# Patient Record
Sex: MALE | Race: BLACK OR AFRICAN AMERICAN | NOT HISPANIC OR LATINO | Employment: FULL TIME | RURAL
[De-identification: names, ages, dates, MRNs, and addresses within clinical notes are randomized per-mention and may not be internally consistent; named-entity substitution may affect disease eponyms.]

---

## 2024-06-28 DIAGNOSIS — R97.20 ELEVATED PSA: Primary | ICD-10-CM

## 2024-07-31 DIAGNOSIS — J44.9 COPD (CHRONIC OBSTRUCTIVE PULMONARY DISEASE): ICD-10-CM

## 2024-07-31 DIAGNOSIS — R91.1 PULMONARY NODULE: Primary | ICD-10-CM

## 2024-09-09 ENCOUNTER — OFFICE VISIT (OUTPATIENT)
Dept: PULMONOLOGY | Facility: CLINIC | Age: 59
End: 2024-09-09
Payer: COMMERCIAL

## 2024-09-09 VITALS
BODY MASS INDEX: 27.43 KG/M2 | HEIGHT: 73 IN | HEART RATE: 93 BPM | WEIGHT: 207 LBS | SYSTOLIC BLOOD PRESSURE: 128 MMHG | OXYGEN SATURATION: 96 % | DIASTOLIC BLOOD PRESSURE: 85 MMHG

## 2024-09-09 DIAGNOSIS — R06.2 WHEEZING: ICD-10-CM

## 2024-09-09 DIAGNOSIS — J44.9 COPD (CHRONIC OBSTRUCTIVE PULMONARY DISEASE): ICD-10-CM

## 2024-09-09 DIAGNOSIS — R91.1 PULMONARY NODULE: ICD-10-CM

## 2024-09-09 DIAGNOSIS — R91.1 SOLITARY PULMONARY NODULE: Primary | ICD-10-CM

## 2024-09-09 PROCEDURE — 99999 PR PBB SHADOW E&M-EST. PATIENT-LVL IV: CPT | Mod: PBBFAC,,, | Performed by: STUDENT IN AN ORGANIZED HEALTH CARE EDUCATION/TRAINING PROGRAM

## 2024-09-09 PROCEDURE — 3008F BODY MASS INDEX DOCD: CPT | Mod: CPTII,,, | Performed by: STUDENT IN AN ORGANIZED HEALTH CARE EDUCATION/TRAINING PROGRAM

## 2024-09-09 PROCEDURE — 99204 OFFICE O/P NEW MOD 45 MIN: CPT | Mod: S$PBB,,, | Performed by: STUDENT IN AN ORGANIZED HEALTH CARE EDUCATION/TRAINING PROGRAM

## 2024-09-09 PROCEDURE — 99214 OFFICE O/P EST MOD 30 MIN: CPT | Mod: PBBFAC | Performed by: STUDENT IN AN ORGANIZED HEALTH CARE EDUCATION/TRAINING PROGRAM

## 2024-09-09 PROCEDURE — 3074F SYST BP LT 130 MM HG: CPT | Mod: CPTII,,, | Performed by: STUDENT IN AN ORGANIZED HEALTH CARE EDUCATION/TRAINING PROGRAM

## 2024-09-09 PROCEDURE — 3079F DIAST BP 80-89 MM HG: CPT | Mod: CPTII,,, | Performed by: STUDENT IN AN ORGANIZED HEALTH CARE EDUCATION/TRAINING PROGRAM

## 2024-09-09 NOTE — PROGRESS NOTES
Patient Name: Mik Lyles   Primary Care Provider: Damaris Salazar FNP-C   Date of Service: 09/09/2024   Reason for Referral:  Pulmonary nodule    Chief Complaint: Referral (RF Damaris Salazar) and Pulmonary Nodules      Subjective:      Mik Lyles is 59 y.o. male with past medical history significant for smoking who now presents in the setting of shortness of breath and pulmonary nodule      Initial clinic visit 9/9/24  5.8% risk   COPD, started nebulized medication with albuterol and inhaled corticosteroids, does not remember all medication .  Significant improvement  Low risk .  I personally reviewed the patient's CT chest and noted the lung nodule in the right middle lobe, and I agree that this may be an intrapulmonary lymph node.    Follow up November 11   Willingness to quit, 3-6 cigars per day  Dyspneic with exertion but able to walk up multiple flights of stairs daily   No wheezing on exam      Assessment and Plan      Solitary pulmonary nodule   Dyspnea with exertion   Wheezing  COPD      Assessment:  Patient with likely evidence of COPD, along with wheezing which may suggest reactive airway disease.  Solitary pulmonary nodule low risk at this time, concerning for intrapulmonary lymph node but requires further evaluation with surveillance imaging.  Continues to have some dyspnea with exertion.    Plan  Repeat CT chest in 3-6 months   Ordered:Complete PFTs with pre and post bronchodilator testing and 6 minute walk test (pulmonary stress test)    Counseled to call the clinic or go to the emergency department/call 911 in the event of worsening symptoms or any other red flag signs/symptoms as explained to the patient in detail    Nicotine dependence, cigarettes   Tobacco cessation counseling      Assessment:  Patient currently smokes 1 pack of cigars (has 3 cigars in it).  We spent a significant amount of time talking about tobacco cessation, the factors that keep the patient smoking such as habit and  cravings.  We also discussed in details ways to stop smoking after the patient expressed a strong desire to quit. The patient is not interested in nicotine replacement therapy at this time.  Duration of counseling approximately 11 minutes.    Plan  We will continue to monitor progress on next visit   Nicotine replacement therapy is not prescribed at this time (patches and p.r.n. lozenges)       Lung cancer screening      Assessment:  The patient meets criteria for lung cancer screening (has a greater than 20 pack-year history of smoking, is 59  years old and has not quit more than 15 years ago).  We had a discussion regarding what lung cancer screening entails and after shared decision-making, the patient agreed to low-dose CT scan.    Plan  Low-dose CT scan for lung cancer screening to be determined after upcoming CT scan        Red Stuart MD  Interventional Pulmonary and Critical Care  [facility]      Problem List Items Addressed This Visit    None  Visit Diagnoses       Pulmonary nodule        COPD (chronic obstructive pulmonary disease)                    History reviewed. No pertinent past medical history.   Past Surgical History:   Procedure Laterality Date    KNEE SURGERY Left      Family History   Problem Relation Name Age of Onset    Hypertension Mother      Cancer Father       Review of patient's allergies indicates:  No Known Allergies   Social History     Tobacco Use    Smoking status: Every Day     Types: Cigars     Start date: 1/1/1985     Passive exposure: Never    Smokeless tobacco: Never   Substance Use Topics    Alcohol use: Yes    Drug use: Yes     Types: Marijuana      Review of Systems       Objective:      Physical Exam  Constitutional:       General: He is not in acute distress.     Appearance: Normal appearance. He is normal weight. He is not ill-appearing or diaphoretic.   HENT:      Head: Normocephalic and atraumatic.      Nose: No congestion or rhinorrhea.       "Mouth/Throat:      Mouth: Mucous membranes are moist.   Cardiovascular:      Rate and Rhythm: Normal rate and regular rhythm.      Pulses: Normal pulses.      Heart sounds: Normal heart sounds.   Pulmonary:      Effort: Pulmonary effort is normal. No respiratory distress.      Breath sounds: No stridor. Wheezing present. No rhonchi or rales.   Chest:      Chest wall: No tenderness.   Abdominal:      General: Abdomen is flat.   Musculoskeletal:      Cervical back: Normal range of motion. No rigidity.      Right lower leg: No edema.      Left lower leg: No edema.   Skin:     General: Skin is warm.      Findings: No erythema.   Neurological:      General: No focal deficit present.      Mental Status: He is alert and oriented to person, place, and time. Mental status is at baseline.   Psychiatric:         Mood and Affect: Mood normal.         Behavior: Behavior normal.         Thought Content: Thought content normal.                9/9/2024     1:15 PM   Pulmonary Function Tests   SpO2 96 %   Height 6' 1" (1.854 m)   Weight 93.9 kg (207 lb)   BMI (Calculated) 27.3         No outpatient encounter medications on file as of 9/9/2024.     No facility-administered encounter medications on file as of 9/9/2024.       Assessment & Plan    As above                                          No orders of the defined types were placed in this encounter.                  "

## 2024-11-11 ENCOUNTER — HOSPITAL ENCOUNTER (OUTPATIENT)
Dept: RADIOLOGY | Facility: HOSPITAL | Age: 59
Discharge: HOME | End: 2024-11-11
Attending: STUDENT IN AN ORGANIZED HEALTH CARE EDUCATION/TRAINING PROGRAM
Payer: COMMERCIAL

## 2024-11-11 ENCOUNTER — CLINICAL SUPPORT (OUTPATIENT)
Dept: PULMONOLOGY | Facility: HOSPITAL | Age: 59
End: 2024-11-11
Attending: STUDENT IN AN ORGANIZED HEALTH CARE EDUCATION/TRAINING PROGRAM
Payer: COMMERCIAL

## 2024-11-11 ENCOUNTER — OFFICE VISIT (OUTPATIENT)
Dept: PULMONOLOGY | Facility: CLINIC | Age: 59
End: 2024-11-11
Payer: COMMERCIAL

## 2024-11-11 ENCOUNTER — OFFICE VISIT (OUTPATIENT)
Dept: UROLOGY | Facility: CLINIC | Age: 59
End: 2024-11-11
Payer: COMMERCIAL

## 2024-11-11 VITALS
RESPIRATION RATE: 16 BRPM | HEART RATE: 104 BPM | HEIGHT: 73 IN | OXYGEN SATURATION: 89 % | BODY MASS INDEX: 26.3 KG/M2 | SYSTOLIC BLOOD PRESSURE: 116 MMHG | DIASTOLIC BLOOD PRESSURE: 88 MMHG | WEIGHT: 198.44 LBS

## 2024-11-11 VITALS — OXYGEN SATURATION: 97 %

## 2024-11-11 VITALS
HEIGHT: 73 IN | DIASTOLIC BLOOD PRESSURE: 90 MMHG | BODY MASS INDEX: 26.51 KG/M2 | TEMPERATURE: 98 F | WEIGHT: 200 LBS | SYSTOLIC BLOOD PRESSURE: 140 MMHG | OXYGEN SATURATION: 97 % | HEART RATE: 101 BPM

## 2024-11-11 VITALS — WEIGHT: 200 LBS | HEIGHT: 73 IN | BODY MASS INDEX: 26.51 KG/M2

## 2024-11-11 DIAGNOSIS — J44.9 CHRONIC OBSTRUCTIVE PULMONARY DISEASE, UNSPECIFIED COPD TYPE: ICD-10-CM

## 2024-11-11 DIAGNOSIS — R31.9 URINARY TRACT INFECTION WITH HEMATURIA, SITE UNSPECIFIED: ICD-10-CM

## 2024-11-11 DIAGNOSIS — R91.1 SOLITARY PULMONARY NODULE: ICD-10-CM

## 2024-11-11 DIAGNOSIS — R97.20 ELEVATED PSA: ICD-10-CM

## 2024-11-11 DIAGNOSIS — R91.1 SOLITARY PULMONARY NODULE: Primary | ICD-10-CM

## 2024-11-11 DIAGNOSIS — Z01.812 PRE-PROCEDURE LAB EXAM: Primary | ICD-10-CM

## 2024-11-11 DIAGNOSIS — J44.89 ASTHMA-COPD OVERLAP SYNDROME: ICD-10-CM

## 2024-11-11 DIAGNOSIS — N39.0 URINARY TRACT INFECTION WITH HEMATURIA, SITE UNSPECIFIED: ICD-10-CM

## 2024-11-11 DIAGNOSIS — C34.90 MALIGNANT NEOPLASM OF UNSPECIFIED PART OF UNSPECIFIED BRONCHUS OR LUNG: ICD-10-CM

## 2024-11-11 DIAGNOSIS — N40.2 PROSTATE NODULE: ICD-10-CM

## 2024-11-11 DIAGNOSIS — J45.901 EXACERBATION OF ASTHMA, UNSPECIFIED ASTHMA SEVERITY, UNSPECIFIED WHETHER PERSISTENT: ICD-10-CM

## 2024-11-11 DIAGNOSIS — R06.00 DYSPNEA, UNSPECIFIED TYPE: ICD-10-CM

## 2024-11-11 DIAGNOSIS — N40.2 PROSTATE NODULE: Primary | ICD-10-CM

## 2024-11-11 DIAGNOSIS — K40.90 NON-RECURRENT UNILATERAL INGUINAL HERNIA WITHOUT OBSTRUCTION OR GANGRENE: ICD-10-CM

## 2024-11-11 PROCEDURE — 1160F RVW MEDS BY RX/DR IN RCRD: CPT | Mod: CPTII,,, | Performed by: UROLOGY

## 2024-11-11 PROCEDURE — 1159F MED LIST DOCD IN RCRD: CPT | Mod: CPTII,,, | Performed by: UROLOGY

## 2024-11-11 PROCEDURE — 27100098 HC SPACER

## 2024-11-11 PROCEDURE — 94060 EVALUATION OF WHEEZING: CPT

## 2024-11-11 PROCEDURE — 99214 OFFICE O/P EST MOD 30 MIN: CPT | Mod: PBBFAC,25 | Performed by: STUDENT IN AN ORGANIZED HEALTH CARE EDUCATION/TRAINING PROGRAM

## 2024-11-11 PROCEDURE — 94729 DIFFUSING CAPACITY: CPT

## 2024-11-11 PROCEDURE — 71250 CT THORAX DX C-: CPT | Mod: 26,,, | Performed by: RADIOLOGY

## 2024-11-11 PROCEDURE — 87086 URINE CULTURE/COLONY COUNT: CPT | Mod: ,,, | Performed by: CLINICAL MEDICAL LABORATORY

## 2024-11-11 PROCEDURE — 99999 PR PBB SHADOW E&M-EST. PATIENT-LVL IV: CPT | Mod: PBBFAC,,, | Performed by: STUDENT IN AN ORGANIZED HEALTH CARE EDUCATION/TRAINING PROGRAM

## 2024-11-11 PROCEDURE — 94618 PULMONARY STRESS TESTING: CPT

## 2024-11-11 PROCEDURE — 3008F BODY MASS INDEX DOCD: CPT | Mod: CPTII,,, | Performed by: UROLOGY

## 2024-11-11 PROCEDURE — 99214 OFFICE O/P EST MOD 30 MIN: CPT | Mod: PBBFAC | Performed by: UROLOGY

## 2024-11-11 PROCEDURE — 3077F SYST BP >= 140 MM HG: CPT | Mod: CPTII,,, | Performed by: UROLOGY

## 2024-11-11 PROCEDURE — 3080F DIAST BP >= 90 MM HG: CPT | Mod: CPTII,,, | Performed by: UROLOGY

## 2024-11-11 PROCEDURE — 71250 CT THORAX DX C-: CPT | Mod: TC

## 2024-11-11 PROCEDURE — 99204 OFFICE O/P NEW MOD 45 MIN: CPT | Mod: S$PBB,,, | Performed by: UROLOGY

## 2024-11-11 PROCEDURE — 94726 PLETHYSMOGRAPHY LUNG VOLUMES: CPT

## 2024-11-11 PROCEDURE — 99999 PR PBB SHADOW E&M-EST. PATIENT-LVL IV: CPT | Mod: PBBFAC,,, | Performed by: UROLOGY

## 2024-11-11 RX ORDER — IPRATROPIUM BROMIDE AND ALBUTEROL SULFATE 2.5; .5 MG/3ML; MG/3ML
3 SOLUTION RESPIRATORY (INHALATION) EVERY 6 HOURS PRN
Qty: 75 ML | Refills: 0 | Status: SHIPPED | OUTPATIENT
Start: 2024-11-11 | End: 2025-11-11

## 2024-11-11 RX ORDER — PREDNISONE 20 MG/1
40 TABLET ORAL DAILY
Qty: 10 TABLET | Refills: 0 | Status: SHIPPED | OUTPATIENT
Start: 2024-11-11 | End: 2024-11-16

## 2024-11-11 RX ORDER — FLUTICASONE PROPIONATE AND SALMETEROL 250; 50 UG/1; UG/1
1 POWDER RESPIRATORY (INHALATION) 2 TIMES DAILY
Qty: 60 EACH | Refills: 11 | Status: SHIPPED | OUTPATIENT
Start: 2024-11-11 | End: 2025-11-11

## 2024-11-11 RX ORDER — BUDESONIDE 0.5 MG/2ML
0.5 INHALANT ORAL 2 TIMES DAILY
Qty: 120 ML | Refills: 5 | Status: SHIPPED | OUTPATIENT
Start: 2024-11-11 | End: 2025-11-11

## 2024-11-11 NOTE — PROGRESS NOTES
Patient Name: Mik Lyles   Primary Care Provider: Damaris Salazar FNP-C   Date of Service:  11/11/24   Reason for Referral:  Pulmonary nodule    Chief Complaint: Pulmonary Nodules (2mo follow up/PFT/6MWT/CT.)      Subjective:      Mik Lyles is 59 y.o. male with past medical history significant for smoking who now presents in the setting of shortness of breath and pulmonary nodule    Follow up clinic visit 11/11/24   Asthma/COPD overlap syndrome on recent pulmonary function testing.  I reviewed the patient's CT chest with stable lung nodule present in the right middle lobe.  Experiencing exacerbation with increased expiratory wheezing, and increased exacerbation with ambulation of the patient's work (works at a [employer]).  Has been using more nebulized albuterol.      Initial clinic visit 9/9/24  5.8% risk   COPD, started nebulized medication with albuterol and inhaled corticosteroids, does not remember all medication .  Significant improvement  Low risk .  I personally reviewed the patient's CT chest and noted the lung nodule in the right middle lobe, and I agree that this may be an intrapulmonary lymph node.    Follow up November 11   Willingness to quit, 3-6 cigars per day  Dyspneic with exertion but able to walk up multiple flights of stairs daily   No wheezing on exam      Assessment and Plan      Solitary pulmonary nodule   Asthma/COPD overlap syndrome, with exacerbation   Wheezing   Dyspnea with exertion      Assessment:  Evidence of asthma/COPD overlap syndrome on pulmonary function testing, does not require oxygen with 6 minute walk test.  Is in a current exacerbation with expiratory wheezing however in no respiratory distress.  Also reviewed patient's pulmonary nodule which is stable at this time.    Plan  PET-CT scan to be ordered and performed in the next 4-6 weeks   Nebulized DuoNebs, budesonide prescribed for the next 7-10 days and ics/Laba inhaler prescribed to be used after that   5  days of prednisone also prescribed with the patient   Counseled to call the clinic or go to the emergency department/call 911 in the event of worsening symptoms or any other red flag signs/symptoms as explained to the patient in detail    Nicotine dependence, cigarettes   Tobacco cessation counseling      Assessment:  Patient currently smokes 1 pack of cigars (has 3 cigars in it).  We spent a significant amount of time talking about tobacco cessation, the factors that keep the patient smoking such as habit and cravings.  We also discussed in details ways to stop smoking after the patient expressed a strong desire to quit. The patient is not interested in nicotine replacement therapy at this time.  Duration of counseling approximately 11 minutes.    Plan  We will continue to monitor progress on next visit   Nicotine replacement therapy is not prescribed at this time (patches and p.r.n. lozenges)       Lung cancer screening      Assessment:  The patient meets criteria for lung cancer screening (has a greater than 20 pack-year history of smoking, is 59  years old and has not quit more than 15 years ago).  We had a discussion regarding what lung cancer screening entails and after shared decision-making, the patient agreed to low-dose CT scan.    Plan  Low-dose CT scan for lung cancer screening to be determined after upcoming PET CT scan        Red Stuart MD  Interventional Pulmonary and Critical Care  [facility]      Problem List Items Addressed This Visit    None  Visit Diagnoses       Solitary pulmonary nodule    -  Primary    Relevant Orders    NM PET CT FDG Skull Base to Mid Thigh    Asthma-COPD overlap syndrome        Dyspnea, unspecified type        Exacerbation of asthma, unspecified asthma severity, unspecified whether persistent        Malignant neoplasm of unspecified part of unspecified bronchus or lung                    History reviewed. No pertinent past medical history.   Past Surgical  History:   Procedure Laterality Date    KNEE SURGERY Left      Family History   Problem Relation Name Age of Onset    Hypertension Mother      Cancer Father       Review of patient's allergies indicates:  No Known Allergies   Social History     Tobacco Use    Smoking status: Every Day     Types: Cigars     Start date: 1/1/1985     Passive exposure: Never    Smokeless tobacco: Never   Substance Use Topics    Alcohol use: Yes    Drug use: Yes     Types: Marijuana      Review of Systems       Objective:      Physical Exam  Constitutional:       General: He is not in acute distress.     Appearance: Normal appearance. He is normal weight. He is not ill-appearing or diaphoretic.   HENT:      Head: Normocephalic and atraumatic.      Nose: No congestion or rhinorrhea.      Mouth/Throat:      Mouth: Mucous membranes are moist.   Cardiovascular:      Rate and Rhythm: Normal rate and regular rhythm.      Pulses: Normal pulses.      Heart sounds: Normal heart sounds.   Pulmonary:      Effort: Pulmonary effort is normal. No respiratory distress.      Breath sounds: No stridor. Wheezing present. No rhonchi or rales.   Chest:      Chest wall: No tenderness.   Abdominal:      General: Abdomen is flat.   Musculoskeletal:      Cervical back: Normal range of motion. No rigidity.      Right lower leg: No edema.      Left lower leg: No edema.   Skin:     General: Skin is warm.      Findings: No erythema.   Neurological:      General: No focal deficit present.      Mental Status: He is alert and oriented to person, place, and time. Mental status is at baseline.   Psychiatric:         Mood and Affect: Mood normal.         Behavior: Behavior normal.         Thought Content: Thought content normal.                11/11/2024     1:09 PM 11/11/2024    10:00 AM 11/11/2024     9:30 AM 11/11/2024     7:59 AM 9/9/2024     1:15 PM   Pulmonary Function Tests   FVC  4.27 liters      FVC%  82      FEV1  2.75 liters      FEV1%  69      FEF 25-75  1.46  "     FEF 25-75%  44      TLC (liters)  9.74 liters      TLC%  129      RV  4.59      RV%  195      DLCO (ml/mmHg sec)  21.32 ml/mmHg sec      DLCO%  70      Peak Flow  461 L/min      FiO2 (%)  21 %      SpO2 89 % 97 %  97 % 96 %   Ordering Provider   Dr. Red Stuart     Performing nurse/tech/RT   Destiny Bryant CRT     Diagnosis   Shortness of Breath     Height 6' 1" (1.854 m)  6' 1" (1.854 m) 6' 1" (1.854 m) 6' 1" (1.854 m)   Weight 90 kg (198 lb 6.6 oz)  90.7 kg (200 lb) 90.7 kg (200 lb) 93.9 kg (207 lb)   BMI (Calculated) 26.2  26.4 26.4 27.3   6MWT Status   completed without stopping     Patient Reported   No complaints     Was O2 used?   No     6MW Distance walked (feet)   1366 feet     Distance walked (meters)   416.36 meters     Did patient stop?   No     Type of assistive device(s) used?   no assistive devices     Oxygen Saturation   96 %     Supplemental Oxygen   Room Air     Heart Rate   92 bpm     Blood Pressure   136/96     Savage Dyspnea Rating    light     Oxygen Saturation   94 %     Supplemental Oxygen   Room Air     Heart Rate   101 bpm     Blood Pressure   144/94     Savage Dyspnea Rating    moderate     Recovery Time (seconds)   60 seconds     Oxygen Saturation   96 %     Supplemental Oxygen   Room Air     Heart Rate   94 bpm     Blood Pressure   138/97           Outpatient Encounter Medications as of 11/11/2024   Medication Sig Dispense Refill    albuterol-ipratropium (DUO-NEB) 2.5 mg-0.5 mg/3 mL nebulizer solution Take 3 mLs by nebulization every 6 (six) hours as needed for Wheezing. Rescue 75 mL 0    budesonide (PULMICORT) 0.5 mg/2 mL nebulizer solution Take 2 mLs (0.5 mg total) by nebulization 2 (two) times daily. Controller 120 mL 5    fluticasone-salmeterol diskus inhaler 250-50 mcg Inhale 1 puff into the lungs 2 (two) times daily. Controller 60 each 11    predniSONE (DELTASONE) 20 MG tablet Take 2 tablets (40 mg total) by mouth once daily. for 5 days 10 tablet 0     No facility-administered " encounter medications on file as of 11/11/2024.       Assessment & Plan    As above                                          Orders Placed This Encounter   Procedures    NM PET CT FDG Skull Base to Mid Thigh     Standing Status:   Future     Standing Expiration Date:   11/11/2025     Order Specific Question:   May the Radiologist modify the order per protocol to meet the clinical needs of the patient?     Answer:   Yes

## 2024-11-11 NOTE — PROGRESS NOTES
Assessment:   1. Prostate nodule  -     MRI Prostate W W/O Contrast; Future; Expected date: 11/11/2024    2. Elevated PSA  -     Ambulatory referral/consult to Urology  -     PSA, Total (Diagnostic); Future; Expected date: 11/11/2024  -     MRI Prostate W W/O Contrast; Future; Expected date: 11/11/2024    3. Chronic obstructive pulmonary disease, unspecified COPD type    4. Non-recurrent unilateral inguinal hernia without obstruction or gangrene  -     Ambulatory referral/consult to General Surgery; Future; Expected date: 11/18/2024         Plan:       We discussed PSA and his examination which is concerning for a nodule in the left prostatic base and discussed concern for prostate cancer based on his examination and his PSA and have ordered a repeat PSA today and scheduled him for a prostate MRI.  In regards to the inguinal hernia on examination it is likely exacerbated by his C pod and lower urinary tract symptoms and will refer to General surgery for evaluation and treatment.     Plan:  PSA today  MRI prostate scan with and without contrast follow-up after MRI  Refer to general surgery for evaluation of inguinal hernia          Rohit Bales MD  Urology  11/11/2024          UROLOGY HISTORY AND PHYSICAL EXAM    Subjective:      Patient ID: Mik Lyles is a 59 y.o. male.    Chief Complaint::   HPI    The patient is a 59-year-old male with a history of COPD who was referred to Urology for the evaluation of an elevated PSA of 8.4 found on a screening examination.  The patient has minimal lower urinary tract symptoms with an international prostate symptom score of 7 and a postvoid residual today of 0 and a bland urine analysis..  He denies any perineal pain and denies any prior history of COPD.    IPSS Questionnaire (AUA-7):  Over the past month    1)  How often have you had a sensation of not emptying your bladder completely after you finish urinating?  0 - Not at all   2)  How often have you had to urinate  again less than two hours after you finished urinating? 2 - Less than half the time   3)  How often have you found you stopped and started again several times when you urinated?  1 - Less than 1 time in 5   4) How difficult have you found it to postpone urination?  0 - Not at all   5) How often have you had a weak urinary stream?  1 - Less than 1 time in 5   6) How often have you had to push or strain to begin urination?  1 - Less than 1 time in 5   7) How many times did you most typically get up to urinate from the time you went to bed until the time you got up in the morning?  2 - 2 times   Total score:  0-7 mildly symptomatic    8-19 moderately symptomatic    20-35 severely symptomatic           History reviewed. No pertinent past medical history.  Past Surgical History:   Procedure Laterality Date    KNEE SURGERY Left         No current outpatient medications on file prior to visit.     No current facility-administered medications on file prior to visit.        Review of patient's allergies indicates:  No Known Allergies  Vitals:    11/11/24 0759   BP: (!) 140/90   Pulse: 101   Temp: 98.2 °F (36.8 °C)          Review of Systems   Constitutional:  Negative for activity change, appetite change, chills, fatigue and fever.   HENT:  Negative for congestion, dental problem, nosebleeds, sinus pressure, sinus pain and tinnitus.    Eyes:  Negative for visual disturbance.   Respiratory:  Positive for shortness of breath and wheezing. Negative for cough and chest tightness.    Cardiovascular:  Negative for chest pain, palpitations and leg swelling.   Gastrointestinal:  Negative for abdominal pain, constipation, diarrhea, nausea and vomiting.   Endocrine: Negative for polyuria.   Genitourinary:  Negative for decreased urine volume, difficulty urinating, dysuria, flank pain, frequency, genital sores, hematuria, penile discharge, penile pain, penile swelling, scrotal swelling, testicular pain and urgency.   Musculoskeletal:   Negative for arthralgias and back pain.   Skin:  Negative for rash.   Neurological:  Negative for dizziness and headaches.   Hematological:  Negative for adenopathy. Does not bruise/bleed easily.   Psychiatric/Behavioral:  Negative for sleep disturbance.       Objective:     Physical Exam  Vitals and nursing note reviewed.   Constitutional:       Appearance: Normal appearance. He is normal weight.   HENT:      Head: Normocephalic and atraumatic.   Eyes:      General: No scleral icterus.     Conjunctiva/sclera: Conjunctivae normal.   Cardiovascular:      Rate and Rhythm: Normal rate.   Pulmonary:      Effort: No respiratory distress.      Breath sounds: Rhonchi present.   Abdominal:      General: There is no distension.      Palpations: Abdomen is soft. There is no mass.      Tenderness: There is no abdominal tenderness.      Hernia: A hernia is present.      Comments: Left inguinal hernia identified on examination   Genitourinary:     Penis: Normal.       Testes: Normal.      Rectum: Normal.      Comments: The prostate is enlarged with a nodule in the left base without any induration or tenderness  Musculoskeletal:         General: No deformity.   Skin:     General: Skin is warm and dry.      Findings: No rash.   Neurological:      General: No focal deficit present.      Mental Status: He is alert.   Psychiatric:         Mood and Affect: Mood normal.         Behavior: Behavior normal.         Thought Content: Thought content normal.         Judgment: Judgment normal.

## 2024-11-11 NOTE — PATIENT INSTRUCTIONS
For the next 7-10 days, stop taking your inhaler and take the nebulizers as follows     DuoNebs 3 times a day (nebulizer medication)   Budesonide 2 times a day (can combine with DuoNebs twice a day)-please rinse her mouth out after taking this one     You have also been prescribed 5 days of prednisone    After 7-10 days of taking the inhaler, you can resume your inhaler (who has been prescribed a combination inhaler tear pharmacy)

## 2024-11-13 LAB — UA COMPLETE W REFLEX CULTURE PNL UR: NO GROWTH

## 2024-11-14 ENCOUNTER — TELEPHONE (OUTPATIENT)
Dept: UROLOGY | Facility: CLINIC | Age: 59
End: 2024-11-14
Payer: COMMERCIAL

## 2024-11-14 NOTE — TELEPHONE ENCOUNTER
Report PSA 14.200 from 11/11/2024 previous one was 8.4 found of screening [place] [address]. Pt has MRI prostate on 11/22/2024 at 9:30 and we will call him when to come in to review results. Patient urine culture was negative that was done at last visit 11/11/2024.       .  Lab Results   Component Value Date    PSA 14.200 (H) 11/11/2024     Reported PSA 14.200 to patient and normal renal creatinine. Pt voiced understanding that he is to be at MRI on 11/22/2024 at 9:30. Pt is aware that we will call him for an appointment after MRI has been read.

## 2024-11-15 NOTE — PROCEDURES
SIX MINUTE WALK DISTANCE  BASELINE VITALS  BP:  136/96  HR:  92  SPO2:  96% on room air  Modified SONDRA Dyspnea Scale Score:  To  Modified SONDRA Fatigue Scale Score:  2    END OF STUDY VITALS:  BP:  138/97  HR:  94  SPO2:  96% on room air  Modified SONDRA Dyspnea Scale Score:  3  Modified SONDRA Fatigue Scale Score:  3    Patient walked 1366 ft which is 64% of expected distance  SpO2 shweta was 93 % at 5 minutes.   Oxygen was not used      Red Stuart MD  Interventional Pulmonary and Critical Care  [facility]

## 2024-11-15 NOTE — PROCEDURES
PFT REPORT:  SPIROMETRY:  The pre-BD FVC is  normal 4.27 L/82 % predicted  The pre-BD FEV1 is moderately reduced, 2.75 L/69 % predicted.  The pre-BD FEV1/FVC ratio is reduced,  64/83 % predicted    The post-BD FVC is normal, 5.11 L/98 % predicted  The post-BD FEV1 is mildly reduced, 3.13 L/78 % predicted  The post-BD FEV1/FVC ratio is reduced,  61/79 % predicted    There is a significant bronchodilator effect.  20% change in FVC and 14% change in FEV1 following bronchodilator administration.    LUNG VOLUMES:  The TLC is increased, 9.74 L/129 % predicted.  The RV is 4.59 L/195 % predicted.  The RV/TLC is  47/147 % predicted.  The FRC is  5.95 L/150 % predicted.  The ERV is  1.36 L/84 % predicted.      DIFFUSION CAPACITY:  The diffusion capacity is corrected for hemoglobin and is mildly reduced, 21.32/70 % predicted.    Interpretation:    Spirometry is impaired.    Lung volumes are impaired.  Diffusion capacity is mildly reduced.  The combination of above results are most suggestive of obstructive lung disease (COPD) them with mildly reduced DLCO is suggestive of COPD.  Positive bronchodilator response is suggestive of asthma/COPD overlap syndrome, with evidence of air trapping on lung volumes.          Red Stuart MD  Interventional Pulmonary and Critical Care  [facility]

## 2024-11-22 ENCOUNTER — TELEPHONE (OUTPATIENT)
Dept: UROLOGY | Facility: CLINIC | Age: 59
End: 2024-11-22
Payer: COMMERCIAL

## 2024-11-22 NOTE — TELEPHONE ENCOUNTER
KAREN I MADE THIS PT AND APPT FOR MRI RESULTS BUT HE WANTS TO TALK TO YOU ABOUT SOME MEDICINE.. 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 I told him that wasn't too alarming, because he could have some blood in the semen. Pt did his MRI prostate today and next appointment with Dr Bales is 12/2/2024 for reviewi. We will call him if it hasn't resulted and change appointment date.

## 2024-12-02 ENCOUNTER — OFFICE VISIT (OUTPATIENT)
Dept: SURGERY | Facility: CLINIC | Age: 59
End: 2024-12-02
Attending: SURGERY
Payer: COMMERCIAL

## 2024-12-02 ENCOUNTER — CLINICAL SUPPORT (OUTPATIENT)
Dept: CARDIOLOGY | Facility: CLINIC | Age: 59
End: 2024-12-02
Payer: COMMERCIAL

## 2024-12-02 ENCOUNTER — OFFICE VISIT (OUTPATIENT)
Dept: UROLOGY | Facility: CLINIC | Age: 59
End: 2024-12-02
Payer: COMMERCIAL

## 2024-12-02 VITALS — HEIGHT: 73 IN | WEIGHT: 210 LBS | BODY MASS INDEX: 27.83 KG/M2

## 2024-12-02 VITALS
HEART RATE: 86 BPM | WEIGHT: 229 LBS | DIASTOLIC BLOOD PRESSURE: 83 MMHG | BODY MASS INDEX: 30.21 KG/M2 | SYSTOLIC BLOOD PRESSURE: 121 MMHG | OXYGEN SATURATION: 97 % | TEMPERATURE: 98 F

## 2024-12-02 DIAGNOSIS — K40.90 NON-RECURRENT UNILATERAL INGUINAL HERNIA WITHOUT OBSTRUCTION OR GANGRENE: Primary | ICD-10-CM

## 2024-12-02 DIAGNOSIS — Z01.810 PRE-PROCEDURAL CARDIOVASCULAR EXAMINATION: ICD-10-CM

## 2024-12-02 DIAGNOSIS — R97.20 ELEVATED PSA: Primary | ICD-10-CM

## 2024-12-02 DIAGNOSIS — C61 PROSTATE CANCER: Primary | ICD-10-CM

## 2024-12-02 DIAGNOSIS — Z01.818 PRE-PROCEDURAL EXAMINATION: ICD-10-CM

## 2024-12-02 PROCEDURE — 99215 OFFICE O/P EST HI 40 MIN: CPT | Mod: PBBFAC | Performed by: SURGERY

## 2024-12-02 PROCEDURE — 99999 PR PBB SHADOW E&M-EST. PATIENT-LVL V: CPT | Mod: PBBFAC,,, | Performed by: SURGERY

## 2024-12-02 PROCEDURE — 93010 ELECTROCARDIOGRAM REPORT: CPT | Mod: S$PBB,,, | Performed by: INTERNAL MEDICINE

## 2024-12-02 PROCEDURE — 1159F MED LIST DOCD IN RCRD: CPT | Mod: CPTII,,, | Performed by: UROLOGY

## 2024-12-02 PROCEDURE — 93005 ELECTROCARDIOGRAM TRACING: CPT | Mod: PBBFAC | Performed by: INTERNAL MEDICINE

## 2024-12-02 PROCEDURE — 1159F MED LIST DOCD IN RCRD: CPT | Mod: CPTII,,, | Performed by: SURGERY

## 2024-12-02 PROCEDURE — 99999 PR PBB SHADOW E&M-EST. PATIENT-LVL II: CPT | Mod: PBBFAC,,,

## 2024-12-02 PROCEDURE — 99214 OFFICE O/P EST MOD 30 MIN: CPT | Mod: PBBFAC,25 | Performed by: UROLOGY

## 2024-12-02 PROCEDURE — 3008F BODY MASS INDEX DOCD: CPT | Mod: CPTII,,, | Performed by: UROLOGY

## 2024-12-02 PROCEDURE — 3079F DIAST BP 80-89 MM HG: CPT | Mod: CPTII,,, | Performed by: UROLOGY

## 2024-12-02 PROCEDURE — 1160F RVW MEDS BY RX/DR IN RCRD: CPT | Mod: CPTII,,, | Performed by: UROLOGY

## 2024-12-02 PROCEDURE — 99999 PR PBB SHADOW E&M-EST. PATIENT-LVL IV: CPT | Mod: PBBFAC,,, | Performed by: UROLOGY

## 2024-12-02 PROCEDURE — 99214 OFFICE O/P EST MOD 30 MIN: CPT | Mod: S$PBB,,, | Performed by: UROLOGY

## 2024-12-02 PROCEDURE — 3074F SYST BP LT 130 MM HG: CPT | Mod: CPTII,,, | Performed by: UROLOGY

## 2024-12-02 PROCEDURE — 3008F BODY MASS INDEX DOCD: CPT | Mod: CPTII,,, | Performed by: SURGERY

## 2024-12-02 PROCEDURE — 99203 OFFICE O/P NEW LOW 30 MIN: CPT | Mod: S$PBB,,, | Performed by: SURGERY

## 2024-12-02 PROCEDURE — 99212 OFFICE O/P EST SF 10 MIN: CPT | Mod: PBBFAC,25

## 2024-12-02 RX ORDER — ONDANSETRON 4 MG/1
8 TABLET, ORALLY DISINTEGRATING ORAL EVERY 8 HOURS PRN
OUTPATIENT
Start: 2024-12-02

## 2024-12-02 RX ORDER — CIPROFLOXACIN 500 MG/1
TABLET ORAL
Qty: 6 TABLET | Refills: 0 | Status: SHIPPED | OUTPATIENT
Start: 2024-12-02

## 2024-12-02 RX ORDER — CEFAZOLIN SODIUM 2 G/50ML
2 SOLUTION INTRAVENOUS
OUTPATIENT
Start: 2024-12-02

## 2024-12-02 RX ORDER — MONTELUKAST SODIUM 10 MG/1
10 TABLET ORAL NIGHTLY
COMMUNITY

## 2024-12-02 RX ORDER — SODIUM CHLORIDE 9 MG/ML
INJECTION, SOLUTION INTRAVENOUS CONTINUOUS
OUTPATIENT
Start: 2024-12-02

## 2024-12-02 RX ORDER — SODIUM PHOSPHATE,MONO-DIBASIC 19G-7G/197
ENEMA (ML) RECTAL
Qty: 2 ENEMA | Refills: 0 | Status: ON HOLD | OUTPATIENT
Start: 2024-12-02 | End: 2024-12-06 | Stop reason: HOSPADM

## 2024-12-02 NOTE — PATIENT INSTRUCTIONS
Your surgery is scheduled for Friday, Dec. 6 at 12:00 at [facility] in [place].    Labwork (1st floor clinic) ___x___  EKG      (2nd floor clinic)___x___      Our office will contact you the day before surgery with your arrival time.  Do not eat or drink anything after midnight the night before surgery (this includes gum, candy, chewing tobacco, etc).  Bring all medication in their original bottles.  Bathe with Hibiclens the night or morning before your surgery.  The morning of your surgery ONLY take blood pressure, heart, acid reflux, or thyroid (if you take a morning dose) medication.  Take these medications with a sip of water.   Be sure to have stopped your blood thinner medication at the appropriate time, as instructed.  Bring your C-Pap machine if you have one.  All jewelry, piercings, or false eyelashes MUST be removed prior to surgery.

## 2024-12-02 NOTE — H&P (VIEW-ONLY)
Assessment:   1. Prostate cancer  -     ciprofloxacin HCl (CIPRO) 500 MG tablet; Start taking this antibiotic the day prior to your biopsy.  Then take the morning of your biopsy with a small sip of water.  Then continue taking this antibiotic until it is completed.  Dispense: 6 tablet; Refill: 0  -     sodium phosphates (FLEET ENEMA EXTRA) 19-7 gram/197 mL Enem; Instilled this enema the night before your biopsy.  Then again the morning of your biopsy instill the 2nd enema to ensure that the rectum is free of stool prior to the biopsy.  Dispense: 2 enema; Refill: 0         Plan:     We discussed an looked at his MRI together and I recommended MRI fusion biopsy with both targeted and template biopsies.  The patient is scheduled for general surgery evaluation today for bilateral inguinal hernias.  I have submitted prescriptions in anticipation of the biopsy to include enemas and antibiotics.  The patient understands the procedure a prostate biopsy, the risks of the procedure the indications and the expected outcome.             Rohit Bales MD  Urology  12/02/2024          UROLOGY HISTORY AND PHYSICAL EXAM    Subjective:      Patient ID: Mik Lyles is a 59 y.o. male.    Chief Complaint::   HPI    The patient presents today for follow-up after his prostate MRI.  He reports that he is being evaluated by General surgery today.     IPSS Questionnaire (AUA-7):  Over the past month    1)  How often have you had a sensation of not emptying your bladder completely after you finish urinating?  1 - Less than 1 time in 5   2)  How often have you had to urinate again less than two hours after you finished urinating? 2 - Less than half the time   3)  How often have you found you stopped and started again several times when you urinated?  2 - Less than half the time   4) How difficult have you found it to postpone urination?  1 - Less than 1 time in 5   5) How often have you had a weak urinary stream?  3 - About half the  time   6) How often have you had to push or strain to begin urination?  0 - Not at all   7) How many times did you most typically get up to urinate from the time you went to bed until the time you got up in the morning?  1 - 1 time   Total score:  0-7 mildly symptomatic    8-19 moderately symptomatic    20-35 severely symptomatic           History reviewed. No pertinent past medical history.  Past Surgical History:   Procedure Laterality Date    KNEE SURGERY Left         Current Outpatient Medications on File Prior to Visit   Medication Sig Dispense Refill    albuterol sulfate 90 mcg/actuation aebs Inhale 180 mcg into the lungs every 4 (four) hours.      albuterol-ipratropium (DUO-NEB) 2.5 mg-0.5 mg/3 mL nebulizer solution Take 3 mLs by nebulization every 6 (six) hours as needed for Wheezing. Rescue 75 mL 0    budesonide (PULMICORT) 0.5 mg/2 mL nebulizer solution Take 2 mLs (0.5 mg total) by nebulization 2 (two) times daily. Controller 120 mL 5    montelukast (SINGULAIR) 10 mg tablet Take 10 mg by mouth every evening.      fluticasone-salmeterol diskus inhaler 250-50 mcg Inhale 1 puff into the lungs 2 (two) times daily. Controller (Patient not taking: Reported on 12/2/2024) 60 each 11     No current facility-administered medications on file prior to visit.        Review of patient's allergies indicates:  No Known Allergies  Vitals:    12/02/24 0902   BP: 121/83   Pulse: 86   Temp: 98.3 °F (36.8 °C)          Review of Systems   Constitutional:  Negative for activity change.   HENT:  Negative for mouth sores.    Cardiovascular:  Negative for palpitations.   Gastrointestinal:  Negative for abdominal pain.   Genitourinary:  Negative for difficulty urinating and hematuria.   Musculoskeletal:  Negative for joint swelling.   Skin:  Negative for wound.   Allergic/Immunologic: Negative for immunocompromised state.      Objective:     Physical Exam  Vitals and nursing note reviewed.   Constitutional:       Appearance:  He is not ill-appearing or diaphoretic.   HENT:      Head: Normocephalic.   Cardiovascular:      Rate and Rhythm: Normal rate.   Pulmonary:      Effort: Pulmonary effort is normal. No respiratory distress.   Abdominal:      General: There is no distension.      Palpations: There is mass.      Tenderness: There is no abdominal tenderness.      Hernia: A hernia is present.   Musculoskeletal:         General: No swelling or deformity.   Skin:     Findings: No bruising.   Neurological:      Mental Status: He is alert. Mental status is at baseline.      Gait: Gait normal.        Lab Results   Component Value Date    PSA 14.200 (H) 11/11/2024        CMP  Creatinine   Date Value Ref Range Status   11/11/2024 1.14 0.70 - 1.30 mg/dL Final     eGFR   Date Value Ref Range Status   11/11/2024 75 >=60 mL/min/1.73m2 Final      BMP  Lab Results   Component Value Date    CREATININE 1.14 11/11/2024    EGFRNORACEVR 75 11/11/2024

## 2024-12-02 NOTE — PATIENT INSTRUCTIONS
[facility]  Instructions for surgery        Your surgery is scheduled for 12/10/2024 at [facility] on the 1st floor of the [facility].Your arrival time is 6 a.m   You will be notified the day before  surgery verifying your arrival time for surgery.    Your preop lab work will be done today. Lab is on the 1st floor of the clinic. EKG is on 2nd floor of the clinic.                                                                                                                                                                                                                                                                                                                                                                           Day of Surgery Instructions      Bring a list of all your medications with you the day of your surgery. You can also give the list to your doctor or nurse during your final clinic appointment before surgery.      Do not eat any solid foods or drink any liquids after 12:00 AM (midnight). This includes gum, hard candy, mints, and chewing tobacco.  Medications: Take any medications specified with a small sip of water the morning of your surgery.  Brush your teeth: You may brush your teeth and rinse your mouth. Do not swallow any water or toothpaste.  Clothing: A button front shirt and loose-fitting clothes are the most comfortable before and after surgery. We also recommend low-heeled shoes.  Hair: Avoid buns, ponytails, or hairpieces at the back of the head. Remove or avoid any clips, pins or bands that bind hair. Do not use hairspray. Before going to surgery, you will need to remove any wigs or hairpieces.  We will cover your hair during surgery. Your privacy regarding personal appearance will be respected.  Fingernails: Please be sure to remove all nail polish before you arrive for surgery. We understand that tips, wraps, gels, etc., are  expensive; however, we ask these products to be removed from at least one finger on each hand. Your fingertips are used to accurately monitor your oxygen level during surgery by a device called an oximeter.  Glasses and Contact Lenses: Wear glasses when possible. If contact lenses must be worn, bring a lens case and solution. If glasses are worn, bring a case for them.  Hearing Aids: If you rely on a hearing aid, wear it to the hospital on the day of surgery. This will ensure you can hear and understand everything we need to communicate with you.  Valuables: Jewelry, including body piercings, Dentures, money, and credit cards should be left at home. [facility] is not responsible for valuables that are not secured in our surgery center.  Makeup, Perfume, Creams, Lotions and Deodorants: Do not use any of these products on the day of surgery. Remove false eyelashes prior to surgery.  Implanted Medical Devices: If you have an implanted device, such as a pacemaker or AICD, bring the device information card (if you have it) with you.  Medical Equipment: If you have been fitted for a brace to wear after surgery or you have been given crutches, bring those with you to the surgery center.  Shower: Take a shower with Hibiclens® (chlorhexidine) (available over the counter). This reduces the chance of infection. PLEASE USE CHLORHEXIDINE WASH THE NIGHT BEFORE SURGERY AND THE MORNING OF SURGERY.  ONLY 2 VISITORS ARE ALLOWED WITH YOU ON DAY OF SURGERY.        Medication instructions:  You may take blood pressure medication with a small drink of water the morning of surgery.    Stop your blood thinner  days before surgery    IF YOU ARE ON ANY OF THESE BLOOD THINNERS, MAKE SURE YOUR PHYSICIAN IS AWARE.  Eliquis/Apixaban            Wafarin/Coumadin,Jantoven  Xarelto/Rivaroxaban      Pletal/Cilostazol  Plavix/Clopidogrel          Pradaxa/Dibigatran      If you are diabetic      Follow the diabetic medicine instructions you received  during your pre-operative visit.  DO NOT take your insulin or diabetic medications the morning of surgery.  When you arrive at the surgical center, be sure to tell the nurse you are diabetic.    The following blood sugar medications have to be stopped prior to surgery:    Hold 24 hours prior to surgery:    Libraglutide - Saxenda, Victoza  Lixisenatide --Adlxyin  Exenatide  --  Byetta  Empaglifozin--Jardiance  Sitaglitin--Januvia    Hold 1 week prior to surgery:    Semaglutide - Ozempic, Wegouy, Rybelsus  Dulaglutide - Trulicity  Tirzepatide - Mounjaro  Exenatide (extended release inj)-- Bydureon BCise      Hold 48 hours prior to surgery:    Metformin, Glucovance, Metaglip, Fortamet, Glucophage, Riomet, Avandamet, Glimepiride            Other Items to bring with you and know    Insurance card  Identification card such as 's license, passport, or other picture ID  Copy of your advance directives  List of medications and allergies, if not already provided  Name and phone number of person to contact if your condition changes significantly. YOU CANNOT DRIVE YOURSELF HOME FROM THE HOSPITAL THE DAY OF SURGERY.  PLEASE UNDERSTAND THAT OUR OFFICE DOES NOT GIVE PATHOLOGY RESULTS OR TEST RESULTS OVER THE PHONE. THIS WILL BE DISCUSSED WITH YOU ON YOUR FOLLOW UP APPOINTMENT.  IF YOU SUBMIT FMLA FORMS, IT WILL TAKE 3-7 DAYS TO COMPLETE THESE          Alcohol and Surgery  We want to help you prepare for and recover from surgery as quickly and safely as possible. Be open and honest with your provider about how many drinks you have per day. Excessive alcohol use is defined as drinking more than three drinks per day. It can affect the outcome of your surgery. Binge drinking (consuming large amounts of alcohol infrequently, such as on weekends) can also affect the outcome of your surgery.  Alcohol withdrawal  If you drink more than three drinks a day, you could have a complication, called alcohol withdrawal, after  surgery.  Alcohol withdrawal is a set of symptoms that people have when they suddenly stop drinking after using alcohol  for a long time. During withdrawal, a person's central nervous system overreacts. This can cause mild symptoms such as shakiness, sweating or hallucinating. It can also cause other more serious side effects. If not treated properly, alcohol withdrawal can cause potentially life-threatening complications after surgery. This can include tremors, seizures, hallucinations, delirium tremors, and even death. Untreated alcohol withdrawal often leads to a longer stay in the hospital, potentially in the Intensive Care Unit.  Chronic heavy drinking also can interfere with several organ systems and biochemical processes in the body.  This interference can cause serious, even life-threatening complications.  Your care team can offer alcohol withdrawal treatment to help:  Decrease the risk of seizures and delirium tremors after surgery  Decrease the risk we will need to restrain you for your own safety or the safety of others  Decrease your risk of falling after surgery  Reduce the use of potent sedative medications  Reduce the time you stay in the hospital after surgery  Reduce the time you might spend on a mechanical ventilator to help you breathe  Lower incidence of organ failure and biochemical complications  Talk to a member of your care team or your primary care physician about your alcohol use if you feel you may be at risk of any of these complications.        Smoking and Surgery  Quitting smoking is extremely important for a successful surgery and recovery. Cigarette smoking compromises your immune system. This increases your risk of an infection after surgery. Quitting the habit before surgery will decrease the surgical risks associated with smoking.

## 2024-12-03 NOTE — PROGRESS NOTES
General Surgery History and Physical      Patient ID: Mik Lyles is a 59 y.o. male.    Chief Complaint: Hernia      HPI:  59-year-old male referred from Urology for a large left inguinal hernia.  He states that it for many years and maybe gotten a little bigger over the last couple years.  Asymptomatic.  No pain no nausea no vomiting.  He was able to push it back in but it does have some tenderness and soreness.  Never had any previous major abdominal operations before.  No hernia repairs.    Review of Systems   Constitutional:  Negative for activity change, appetite change, fatigue and fever.   HENT:  Negative for trouble swallowing.    Respiratory:  Negative for cough and shortness of breath.    Cardiovascular:  Negative for chest pain and palpitations.   Gastrointestinal:  Negative for abdominal distention, abdominal pain, blood in stool, constipation and diarrhea.   Genitourinary:  Negative for flank pain.   Musculoskeletal:  Negative for neck pain and neck stiffness.   Neurological:  Negative for weakness.       Current Outpatient Medications   Medication Sig Dispense Refill    albuterol sulfate 90 mcg/actuation aebs Inhale 180 mcg into the lungs every 4 (four) hours.      albuterol-ipratropium (DUO-NEB) 2.5 mg-0.5 mg/3 mL nebulizer solution Take 3 mLs by nebulization every 6 (six) hours as needed for Wheezing. Rescue 75 mL 0    ciprofloxacin HCl (CIPRO) 500 MG tablet Start taking this antibiotic the day prior to your biopsy.  Then take the morning of your biopsy with a small sip of water.  Then continue taking this antibiotic until it is completed. 6 tablet 0    montelukast (SINGULAIR) 10 mg tablet Take 10 mg by mouth every evening.      sodium phosphates (FLEET ENEMA EXTRA) 19-7 gram/197 mL Enem Instilled this enema the night before your biopsy.  Then again the morning of your biopsy instill the 2nd enema to  ensure that the rectum is free of stool prior to the biopsy. 2 enema 0    budesonide (PULMICORT) 0.5 mg/2 mL nebulizer solution Take 2 mLs (0.5 mg total) by nebulization 2 (two) times daily. Controller (Patient not taking: Reported on 12/2/2024) 120 mL 5    fluticasone-salmeterol diskus inhaler 250-50 mcg Inhale 1 puff into the lungs 2 (two) times daily. Controller (Patient not taking: Reported on 12/2/2024) 60 each 11     No current facility-administered medications for this visit.       Review of patient's allergies indicates:  No Known Allergies    History reviewed. No pertinent past medical history.    Past Surgical History:   Procedure Laterality Date    KNEE SURGERY Left        Family History   Problem Relation Name Age of Onset    Hypertension Mother      Cancer Father         Social History     Socioeconomic History    Marital status: Single   Tobacco Use    Smoking status: Former     Types: Cigars     Start date: 1/1/1985     Passive exposure: Never    Smokeless tobacco: Never    Tobacco comments:     Quit one week ago.    Substance and Sexual Activity    Alcohol use: Yes    Drug use: Yes     Types: Marijuana    Sexual activity: Not Currently       There were no vitals filed for this visit.    Physical Exam  Constitutional:       General: He is not in acute distress.  HENT:      Head: Normocephalic.   Cardiovascular:      Rate and Rhythm: Normal rate and regular rhythm.      Pulses: Normal pulses.   Pulmonary:      Effort: Pulmonary effort is normal. No respiratory distress.      Breath sounds: Normal breath sounds.   Abdominal:      General: Abdomen is flat. There is no distension.      Palpations: Abdomen is soft.      Tenderness: There is no abdominal tenderness.      Hernia: A hernia (Large left inguinal hernia reducible with some tension and soreness with reduction) is present.   Musculoskeletal:         General: Normal range of motion.   Skin:     General: Skin is warm.   Neurological:      General:  No focal deficit present.      Mental Status: He is oriented to person, place, and time.         Assessment & Plan:    Non-recurrent unilateral inguinal hernia without obstruction or gangrene  -     Ambulatory referral/consult to General Surgery        Patient to go to the operating room for robotic left possible right inguinal hernia pair next week.  Risks and benefits explained to the patient clear risk of bleeding, infection, open operation, injury to surrounding organs, he is aware of the use of mesh in his associated issues including but not limited to bleeding, erosion, migration.  He is aware of the risk of recurrence even with the use of mesh.  He understands he has a risk of seroma or hematoma formation in his also aware of contralateral side being fixed if we find a hernia there.  Understands he is a high-risk due to the large size.  All questions answered.

## 2024-12-04 LAB
OHS QRS DURATION: 78 MS
OHS QTC CALCULATION: 399 MS

## 2024-12-06 ENCOUNTER — HOSPITAL ENCOUNTER (OUTPATIENT)
Facility: HOSPITAL | Age: 59
Discharge: HOME | End: 2024-12-06
Attending: UROLOGY | Admitting: UROLOGY
Payer: COMMERCIAL

## 2024-12-06 ENCOUNTER — ANESTHESIA EVENT (OUTPATIENT)
Dept: SURGERY | Facility: HOSPITAL | Age: 59
End: 2024-12-06
Payer: COMMERCIAL

## 2024-12-06 ENCOUNTER — ANESTHESIA (OUTPATIENT)
Dept: SURGERY | Facility: HOSPITAL | Age: 59
End: 2024-12-06
Payer: COMMERCIAL

## 2024-12-06 VITALS
DIASTOLIC BLOOD PRESSURE: 96 MMHG | RESPIRATION RATE: 16 BRPM | TEMPERATURE: 99 F | SYSTOLIC BLOOD PRESSURE: 140 MMHG | OXYGEN SATURATION: 99 % | BODY MASS INDEX: 27.83 KG/M2 | WEIGHT: 210 LBS | HEIGHT: 73 IN | HEART RATE: 75 BPM

## 2024-12-06 DIAGNOSIS — R97.20 ELEVATED PSA: Primary | ICD-10-CM

## 2024-12-06 DIAGNOSIS — R97.20 ELEVATED PSA: ICD-10-CM

## 2024-12-06 PROCEDURE — 55700 PR BIOPSY OF PROSTATE,NEEDLE/PUNCH: CPT | Mod: ,,, | Performed by: UROLOGY

## 2024-12-06 PROCEDURE — D9220A PRA ANESTHESIA: Mod: ANES,,, | Performed by: ANESTHESIOLOGY

## 2024-12-06 PROCEDURE — 71000033 HC RECOVERY, INTIAL HOUR: Performed by: UROLOGY

## 2024-12-06 PROCEDURE — D9220A PRA ANESTHESIA: Mod: CRNA,,, | Performed by: NURSE ANESTHETIST, CERTIFIED REGISTERED

## 2024-12-06 PROCEDURE — 25000003 PHARM REV CODE 250: Performed by: UROLOGY

## 2024-12-06 PROCEDURE — 71000015 HC POSTOP RECOV 1ST HR: Performed by: UROLOGY

## 2024-12-06 PROCEDURE — 76942 ECHO GUIDE FOR BIOPSY: CPT | Mod: 26,,, | Performed by: UROLOGY

## 2024-12-06 PROCEDURE — 27000177 HC AIRWAY, LARYNGEAL MASK: Performed by: NURSE ANESTHETIST, CERTIFIED REGISTERED

## 2024-12-06 PROCEDURE — 37000008 HC ANESTHESIA 1ST 15 MINUTES: Performed by: UROLOGY

## 2024-12-06 PROCEDURE — 63600175 PHARM REV CODE 636 W HCPCS: Performed by: NURSE ANESTHETIST, CERTIFIED REGISTERED

## 2024-12-06 PROCEDURE — 88305 TISSUE EXAM BY PATHOLOGIST: CPT | Mod: 26,,, | Performed by: PATHOLOGY

## 2024-12-06 PROCEDURE — 27000510 HC BLANKET BAIR HUGGER ANY SIZE: Performed by: NURSE ANESTHETIST, CERTIFIED REGISTERED

## 2024-12-06 PROCEDURE — 36000704 HC OR TIME LEV I 1ST 15 MIN: Performed by: UROLOGY

## 2024-12-06 PROCEDURE — 36000705 HC OR TIME LEV I EA ADD 15 MIN: Performed by: UROLOGY

## 2024-12-06 PROCEDURE — 27000716 HC OXISENSOR PROBE, ANY SIZE: Performed by: NURSE ANESTHETIST, CERTIFIED REGISTERED

## 2024-12-06 PROCEDURE — 88344 IMHCHEM/IMCYTCHM EA MLT ANTB: CPT | Mod: 26,,, | Performed by: PATHOLOGY

## 2024-12-06 PROCEDURE — 25000003 PHARM REV CODE 250: Performed by: NURSE ANESTHETIST, CERTIFIED REGISTERED

## 2024-12-06 PROCEDURE — 37000009 HC ANESTHESIA EA ADD 15 MINS: Performed by: UROLOGY

## 2024-12-06 PROCEDURE — 88344 IMHCHEM/IMCYTCHM EA MLT ANTB: CPT | Mod: TC,91,SUR | Performed by: UROLOGY

## 2024-12-06 PROCEDURE — 63600175 PHARM REV CODE 636 W HCPCS: Performed by: UROLOGY

## 2024-12-06 RX ORDER — DIPHENHYDRAMINE HYDROCHLORIDE 50 MG/ML
25 INJECTION INTRAMUSCULAR; INTRAVENOUS EVERY 6 HOURS PRN
Status: DISCONTINUED | OUTPATIENT
Start: 2024-12-06 | End: 2024-12-06 | Stop reason: HOSPADM

## 2024-12-06 RX ORDER — LIDOCAINE HYDROCHLORIDE 20 MG/ML
INJECTION, SOLUTION EPIDURAL; INFILTRATION; INTRACAUDAL; PERINEURAL
Status: DISCONTINUED | OUTPATIENT
Start: 2024-12-06 | End: 2024-12-06

## 2024-12-06 RX ORDER — ONDANSETRON HYDROCHLORIDE 2 MG/ML
INJECTION, SOLUTION INTRAVENOUS
Status: DISCONTINUED | OUTPATIENT
Start: 2024-12-06 | End: 2024-12-06

## 2024-12-06 RX ORDER — ONDANSETRON HYDROCHLORIDE 2 MG/ML
4 INJECTION, SOLUTION INTRAVENOUS DAILY PRN
Status: DISCONTINUED | OUTPATIENT
Start: 2024-12-06 | End: 2024-12-06 | Stop reason: HOSPADM

## 2024-12-06 RX ORDER — FENTANYL CITRATE 50 UG/ML
INJECTION, SOLUTION INTRAMUSCULAR; INTRAVENOUS
Status: DISCONTINUED | OUTPATIENT
Start: 2024-12-06 | End: 2024-12-06

## 2024-12-06 RX ORDER — HYDROMORPHONE HYDROCHLORIDE 2 MG/ML
0.5 INJECTION, SOLUTION INTRAMUSCULAR; INTRAVENOUS; SUBCUTANEOUS EVERY 5 MIN PRN
Status: DISCONTINUED | OUTPATIENT
Start: 2024-12-06 | End: 2024-12-06 | Stop reason: HOSPADM

## 2024-12-06 RX ORDER — PROPOFOL 10 MG/ML
VIAL (ML) INTRAVENOUS
Status: DISCONTINUED | OUTPATIENT
Start: 2024-12-06 | End: 2024-12-06

## 2024-12-06 RX ORDER — GLUCAGON 1 MG
1 KIT INJECTION
Status: DISCONTINUED | OUTPATIENT
Start: 2024-12-06 | End: 2024-12-06 | Stop reason: HOSPADM

## 2024-12-06 RX ORDER — MEPERIDINE HYDROCHLORIDE 25 MG/ML
25 INJECTION INTRAMUSCULAR; INTRAVENOUS; SUBCUTANEOUS EVERY 10 MIN PRN
Status: DISCONTINUED | OUTPATIENT
Start: 2024-12-06 | End: 2024-12-06 | Stop reason: HOSPADM

## 2024-12-06 RX ORDER — MIDAZOLAM HYDROCHLORIDE 1 MG/ML
INJECTION INTRAMUSCULAR; INTRAVENOUS
Status: DISCONTINUED | OUTPATIENT
Start: 2024-12-06 | End: 2024-12-06

## 2024-12-06 RX ORDER — MORPHINE SULFATE 10 MG/ML
4 INJECTION INTRAMUSCULAR; INTRAVENOUS; SUBCUTANEOUS EVERY 5 MIN PRN
Status: DISCONTINUED | OUTPATIENT
Start: 2024-12-06 | End: 2024-12-06 | Stop reason: HOSPADM

## 2024-12-06 RX ORDER — EPHEDRINE SULFATE 50 MG/ML
INJECTION, SOLUTION INTRAVENOUS
Status: DISCONTINUED | OUTPATIENT
Start: 2024-12-06 | End: 2024-12-06

## 2024-12-06 RX ORDER — SODIUM CHLORIDE, SODIUM LACTATE, POTASSIUM CHLORIDE, CALCIUM CHLORIDE 600; 310; 30; 20 MG/100ML; MG/100ML; MG/100ML; MG/100ML
INJECTION, SOLUTION INTRAVENOUS CONTINUOUS
Status: CANCELLED | OUTPATIENT
Start: 2024-12-06

## 2024-12-06 RX ORDER — SODIUM CHLORIDE 9 MG/ML
INJECTION, SOLUTION INTRAVENOUS CONTINUOUS
Status: DISCONTINUED | OUTPATIENT
Start: 2024-12-06 | End: 2024-12-06 | Stop reason: HOSPADM

## 2024-12-06 RX ORDER — CEFTRIAXONE 2 G/1
2 INJECTION, POWDER, FOR SOLUTION INTRAMUSCULAR; INTRAVENOUS ONCE
Status: DISCONTINUED | OUTPATIENT
Start: 2024-12-06 | End: 2024-12-06 | Stop reason: HOSPADM

## 2024-12-06 RX ORDER — BUDESONIDE, GLYCOPYRROLATE, AND FORMOTEROL FUMARATE 160; 9; 4.8 UG/1; UG/1; UG/1
2 AEROSOL, METERED RESPIRATORY (INHALATION) 2 TIMES DAILY
COMMUNITY

## 2024-12-06 RX ORDER — SODIUM CHLORIDE, SODIUM LACTATE, POTASSIUM CHLORIDE, CALCIUM CHLORIDE 600; 310; 30; 20 MG/100ML; MG/100ML; MG/100ML; MG/100ML
INJECTION, SOLUTION INTRAVENOUS CONTINUOUS
Status: DISCONTINUED | OUTPATIENT
Start: 2024-12-06 | End: 2024-12-06 | Stop reason: HOSPADM

## 2024-12-06 RX ADMIN — PROPOFOL 200 MG: 10 INJECTION, EMULSION INTRAVENOUS at 02:12

## 2024-12-06 RX ADMIN — EPHEDRINE SULFATE 25 MG: 50 INJECTION INTRAVENOUS at 02:12

## 2024-12-06 RX ADMIN — LIDOCAINE HYDROCHLORIDE 100 MG: 20 INJECTION, SOLUTION EPIDURAL; INFILTRATION; INTRACAUDAL; PERINEURAL at 02:12

## 2024-12-06 RX ADMIN — GENTAMICIN SULFATE 160 MG: 40 INJECTION, SOLUTION INTRAMUSCULAR; INTRAVENOUS at 12:12

## 2024-12-06 RX ADMIN — FENTANYL CITRATE 100 MCG: 50 INJECTION, SOLUTION INTRAMUSCULAR; INTRAVENOUS at 02:12

## 2024-12-06 RX ADMIN — ONDANSETRON 8 MG: 2 INJECTION INTRAMUSCULAR; INTRAVENOUS at 02:12

## 2024-12-06 RX ADMIN — SODIUM CHLORIDE: 9 INJECTION, SOLUTION INTRAVENOUS at 02:12

## 2024-12-06 RX ADMIN — MIDAZOLAM HYDROCHLORIDE 2 MG: 1 INJECTION, SOLUTION INTRAMUSCULAR; INTRAVENOUS at 02:12

## 2024-12-06 RX ADMIN — SODIUM CHLORIDE: 9 INJECTION, SOLUTION INTRAVENOUS at 12:12

## 2024-12-06 NOTE — DISCHARGE SUMMARY
[facility]  Discharge Note  Short Stay    Procedure(s) (LRB):  BIOPSY PROSTATE USING PROSTATE MAPPING (Bilateral)      OUTCOME: Patient tolerated treatment/procedure well without complication and is now ready for discharge.    DISPOSITION: Home or Self Care    FINAL DIAGNOSIS:  Elevated PSA    FOLLOWUP: In clinic    DISCHARGE INSTRUCTIONS:  Continue antibiotics.  Stay well hydrated.  Avoid strenuous activity today and tomorrow, but then resume normal activities.  Shower normally.     TIME SPENT ON DISCHARGE: 15 minutes

## 2024-12-06 NOTE — OR NURSING
1449 PT REC'D TO PACU. VSS. SATS 96% ON RA. DENIES PAIN AT THIS TIME. SURG SITE ADDRESSED C/D/I. WILL CONT TO MONITOR.    1457 UPDATE GIVEN TO RELATIVE VIA TEXT MESSAGE.     1525 TRANSFERRED TO [facility] IN STABLE CONDITION. REPORT GIVEN TO KRYSTINA POMPA. /99 HR 79 O2 SAT 97% ON RA. SURGICAL SITES ADDRESSED. FAMILY AT BEDSIDE.

## 2024-12-06 NOTE — OP NOTE
DATE: December 6th, 2024    PROCEDURE: MRI Fusion TRUS Prostate Biopsy     SURGEON: Rohit Bales    ASSISTANTS: Norman Hernandez, Kuldip Bowling     PREOPERATIVE DIAGNOSIS: elevated PSA 14.2    POSTOPERATIVE DIAGNOSIS: Same as above; 2.5 cm lesion Left Peripheral Zone     ANESTHESIA: Local with sedation    INTRAVENOUS FLUIDS: IV crystalloid    ESTIMATED BLOOD LOSS: <5ml    TUBES AND DRAINS: None    SPECIMENS: 12 standard template prostate biopsy; 5 targeted lesion prostate biopsy    COMPLICATIONS: None      OPERATIVE REPORT: The patient was transferred to the operating room suite and placed on continuous pulse oximetry and cardiac monitoring by anesthesia.  2gm Ceftriaxone and 160mg gentamicin antibiotics given.  IV sedation and general anesthetic were administered. The patient was placed in the lateral recumbant position and time out performed. The case began with ultrasound probe in the rectum. MRI fusion technology was used to overlay the MRI fusion biopsy with the tranrectal ultrasound image. There was an area of interest located in the left periphery. The region of interest was biopsied. A total of 5 biopsies were obtained. At this point 12-core prostate biopsies were obtained, they are obtained from the right and portions of the prostate and on each side were taken from the apex, medial and base with biopsy taken from the medial and lateral portions of each of the respected spots. These were then sent in formalin and sent for pathological examination. At the end of the procedure, the probe was removed. The patient tolerated the procedure well and was transferred to PACU in stable condition.

## 2024-12-06 NOTE — ANESTHESIA PREPROCEDURE EVALUATION
12/06/2024  Mik Lyles is a 59 y.o., male.      Pre-op Assessment    I have reviewed the Patient Summary Reports.    I have reviewed the NPO Status.   I have reviewed the Medications.     Review of Systems         Anesthesia Plan  Type of Anesthesia, risks & benefits discussed:    Anesthesia Type: Gen Supraglottic Airway  Intra-op Monitoring Plan: Standard ASA Monitors  Post Op Pain Control Plan: IV/PO Opioids PRN  Induction:  IV  Informed Consent: Informed consent signed with the Patient and all parties understand the risks and agree with anesthesia plan.  All questions answered.   ASA Score: 3    Ready For Surgery From Anesthesia Perspective.     .  NPO greater than 8 hours except for clear liquids  No known anesthetic complications  NKDA    Hct 45  12/2/24 EKG: Normal sinus rhythm; 83 bpm    COPD    MP III; poor dentition; adequate ROM at neck

## 2024-12-07 NOTE — ANESTHESIA POSTPROCEDURE EVALUATION
Anesthesia Post Evaluation    Patient: Mik Lyles    Procedure(s) Performed: Procedure(s) (LRB):  BIOPSY PROSTATE USING PROSTATE MAPPING (Bilateral)    Final Anesthesia Type: general      Patient location during evaluation: PACU  Post-procedure vital signs: reviewed and stable  Pain management: adequate  Airway patency: patent    PONV status at discharge: No PONV  Anesthetic complications: no      Cardiovascular status: hemodynamically stable  Respiratory status: unassisted  Hydration status: euvolemic  Follow-up not needed.              Vitals Value Taken Time   /96 12/06/24 1601   Temp 37 °C (98.6 °F) 12/06/24 1451   Pulse 77 12/06/24 1601   Resp 16 12/06/24 1600   SpO2 99 % 12/06/24 1600   Vitals shown include unfiled device data.      Event Time   Out of Recovery 15:20:00         Pain/Mic Score: Mic Score: 10 (12/6/2024  3:25 PM)  Modified Mic Score: 20 (12/6/2024  4:15 PM)

## 2024-12-09 LAB
DHEA SERPL-MCNC: NORMAL
ESTROGEN SERPL-MCNC: NORMAL PG/ML
INSULIN SERPL-ACNC: NORMAL U[IU]/ML
LAB AP GROSS DESCRIPTION: NORMAL
LAB AP LABORATORY NOTES: NORMAL
T3RU NFR SERPL: NORMAL %

## 2024-12-10 ENCOUNTER — OFFICE VISIT (OUTPATIENT)
Dept: UROLOGY | Facility: CLINIC | Age: 59
End: 2024-12-10
Payer: COMMERCIAL

## 2024-12-10 ENCOUNTER — HOSPITAL ENCOUNTER (OUTPATIENT)
Facility: HOSPITAL | Age: 59
Discharge: HOME | End: 2024-12-10
Attending: SURGERY | Admitting: SURGERY
Payer: COMMERCIAL

## 2024-12-10 VITALS
HEART RATE: 83 BPM | SYSTOLIC BLOOD PRESSURE: 129 MMHG | WEIGHT: 210 LBS | RESPIRATION RATE: 18 BRPM | DIASTOLIC BLOOD PRESSURE: 87 MMHG | BODY MASS INDEX: 27.83 KG/M2 | TEMPERATURE: 98 F | HEIGHT: 73 IN | OXYGEN SATURATION: 95 %

## 2024-12-10 VITALS
HEART RATE: 102 BPM | HEIGHT: 73 IN | WEIGHT: 208 LBS | DIASTOLIC BLOOD PRESSURE: 93 MMHG | TEMPERATURE: 98 F | BODY MASS INDEX: 27.57 KG/M2 | SYSTOLIC BLOOD PRESSURE: 136 MMHG

## 2024-12-10 DIAGNOSIS — K40.90 NON-RECURRENT UNILATERAL INGUINAL HERNIA WITHOUT OBSTRUCTION OR GANGRENE: ICD-10-CM

## 2024-12-10 DIAGNOSIS — C61 PROSTATE CANCER: Primary | ICD-10-CM

## 2024-12-10 PROCEDURE — 1160F RVW MEDS BY RX/DR IN RCRD: CPT | Mod: CPTII,,, | Performed by: UROLOGY

## 2024-12-10 PROCEDURE — 99999 PR PBB SHADOW E&M-EST. PATIENT-LVL V: CPT | Mod: PBBFAC,,, | Performed by: UROLOGY

## 2024-12-10 PROCEDURE — 99212 OFFICE O/P EST SF 10 MIN: CPT | Mod: S$PBB,,, | Performed by: UROLOGY

## 2024-12-10 PROCEDURE — 1159F MED LIST DOCD IN RCRD: CPT | Mod: CPTII,,, | Performed by: UROLOGY

## 2024-12-10 PROCEDURE — 99499 UNLISTED E&M SERVICE: CPT | Mod: ,,, | Performed by: SURGERY

## 2024-12-10 PROCEDURE — 3008F BODY MASS INDEX DOCD: CPT | Mod: CPTII,,, | Performed by: UROLOGY

## 2024-12-10 PROCEDURE — 3075F SYST BP GE 130 - 139MM HG: CPT | Mod: CPTII,,, | Performed by: UROLOGY

## 2024-12-10 PROCEDURE — 99215 OFFICE O/P EST HI 40 MIN: CPT | Mod: PBBFAC | Performed by: UROLOGY

## 2024-12-10 PROCEDURE — 3080F DIAST BP >= 90 MM HG: CPT | Mod: CPTII,,, | Performed by: UROLOGY

## 2024-12-10 RX ORDER — OXYCODONE HYDROCHLORIDE 5 MG/1
5 TABLET ORAL
Status: CANCELLED | OUTPATIENT
Start: 2024-12-10

## 2024-12-10 RX ORDER — SODIUM CHLORIDE, SODIUM LACTATE, POTASSIUM CHLORIDE, CALCIUM CHLORIDE 600; 310; 30; 20 MG/100ML; MG/100ML; MG/100ML; MG/100ML
125 INJECTION, SOLUTION INTRAVENOUS CONTINUOUS
Status: CANCELLED | OUTPATIENT
Start: 2024-12-10

## 2024-12-10 RX ORDER — CEFAZOLIN 2 G/1
2 INJECTION, POWDER, FOR SOLUTION INTRAMUSCULAR; INTRAVENOUS
Status: DISCONTINUED | OUTPATIENT
Start: 2024-12-10 | End: 2024-12-10 | Stop reason: HOSPADM

## 2024-12-10 RX ORDER — ONDANSETRON 4 MG/1
8 TABLET, ORALLY DISINTEGRATING ORAL EVERY 8 HOURS PRN
Status: DISCONTINUED | OUTPATIENT
Start: 2024-12-10 | End: 2024-12-10 | Stop reason: HOSPADM

## 2024-12-10 RX ORDER — IPRATROPIUM BROMIDE AND ALBUTEROL SULFATE 2.5; .5 MG/3ML; MG/3ML
3 SOLUTION RESPIRATORY (INHALATION)
Status: CANCELLED | OUTPATIENT
Start: 2024-12-10

## 2024-12-10 RX ORDER — DIPHENHYDRAMINE HYDROCHLORIDE 50 MG/ML
25 INJECTION INTRAMUSCULAR; INTRAVENOUS EVERY 6 HOURS PRN
Status: CANCELLED | OUTPATIENT
Start: 2024-12-10

## 2024-12-10 RX ORDER — MORPHINE SULFATE 10 MG/ML
4 INJECTION INTRAMUSCULAR; INTRAVENOUS; SUBCUTANEOUS EVERY 5 MIN PRN
Status: CANCELLED | OUTPATIENT
Start: 2024-12-10

## 2024-12-10 RX ORDER — HYDROMORPHONE HYDROCHLORIDE 2 MG/ML
0.5 INJECTION, SOLUTION INTRAMUSCULAR; INTRAVENOUS; SUBCUTANEOUS EVERY 5 MIN PRN
Status: CANCELLED | OUTPATIENT
Start: 2024-12-10

## 2024-12-10 RX ORDER — MEPERIDINE HYDROCHLORIDE 25 MG/ML
25 INJECTION INTRAMUSCULAR; INTRAVENOUS; SUBCUTANEOUS EVERY 10 MIN PRN
Status: CANCELLED | OUTPATIENT
Start: 2024-12-10 | End: 2024-12-10

## 2024-12-10 RX ORDER — SODIUM CHLORIDE 9 MG/ML
INJECTION, SOLUTION INTRAVENOUS CONTINUOUS
Status: DISCONTINUED | OUTPATIENT
Start: 2024-12-10 | End: 2024-12-10 | Stop reason: HOSPADM

## 2024-12-10 RX ORDER — ONDANSETRON HYDROCHLORIDE 2 MG/ML
4 INJECTION, SOLUTION INTRAVENOUS DAILY PRN
Status: CANCELLED | OUTPATIENT
Start: 2024-12-10

## 2024-12-10 NOTE — PROGRESS NOTES
Pt biopsy came back as prostate cancer from last week.  Will postpone surgery and make sure he sees urology first

## 2024-12-11 NOTE — PROGRESS NOTES
Initially patient's surgery was canceled due to the diagnosis of prostate cancer.  Patient has a discuss with the urologist and he is going to go the radiation route in his okay to proceed with the inguinal hernia repair.  Already been approved hopefully there is not issues and will set him up for this Friday for robotic inguinal hernia repair.  All questions answered.

## 2024-12-11 NOTE — PROGRESS NOTES
Assessment:   1. Prostate cancer  -     Ambulatory referral/consult to Radiation Oncology; Future; Expected date: 12/18/2024         Plan:     We discussed my concern for extraprostatic disease and that with the large inguinal hernia surgical treatment would be difficult and that my concern is that his disease is extra prostatic and have referred to Radiation Oncology for evaluation and consultation.       Rohit Bales MD  Urology  12/11/2024          UROLOGY HISTORY AND PHYSICAL EXAM    Subjective:      Patient ID: Mik Lyles is a 59 y.o. male.    Chief Complaint::   HPI    The patient is a 59-year-old male that presents today to discuss his prostate biopsy report that has demonstrated prostate cancer.  He was scheduled for inguinal hernia repair today but that procedure has been canceled and so he walked over to talk with us about his biopsy report which was finalized earlier today. He wanted to discuss treatment options.  He has recently quit smoking.     Past Medical History:   Diagnosis Date    COPD (chronic obstructive pulmonary disease)      Past Surgical History:   Procedure Laterality Date    BIOPSY, PROSTATE, USING PROSTATE MAPPING Bilateral 12/6/2024    Procedure: BIOPSY PROSTATE USING PROSTATE MAPPING;  Surgeon: Rohit Bales MD;  Location: [facility];  Service: Urology;  Laterality: Bilateral;    KNEE SURGERY Left         Current Outpatient Medications on File Prior to Visit   Medication Sig Dispense Refill    albuterol sulfate 90 mcg/actuation aebs Inhale 180 mcg into the lungs every 4 (four) hours as needed. prn      albuterol-ipratropium (DUO-NEB) 2.5 mg-0.5 mg/3 mL nebulizer solution Take 3 mLs by nebulization every 6 (six) hours as needed for Wheezing. Rescue 75 mL 0    budesonide-glycopyr-formoterol (BREZTRI AEROSPHERE) 160-9-4.8 mcg/actuation HFAA Inhale 2 puffs into the lungs 2 (two) times daily.      ciprofloxacin HCl (CIPRO) 500 MG tablet Start taking this antibiotic the day prior  to your biopsy.  Then take the morning of your biopsy with a small sip of water.  Then continue taking this antibiotic until it is completed. (Patient taking differently: Start taking this antibiotic the day prior to your biopsy.  Then take the morning of your biopsy with a small sip of water.  Then continue taking this antibiotic until it is completed.    COMPLETED) 6 tablet 0    montelukast (SINGULAIR) 10 mg tablet Take 10 mg by mouth every evening.      budesonide (PULMICORT) 0.5 mg/2 mL nebulizer solution Take 2 mLs (0.5 mg total) by nebulization 2 (two) times daily. Controller (Patient not taking: Reported on 12/10/2024) 120 mL 5    fluticasone-salmeterol diskus inhaler 250-50 mcg Inhale 1 puff into the lungs 2 (two) times daily. Controller (Patient not taking: Reported on 12/10/2024) 60 each 11     No current facility-administered medications on file prior to visit.        Review of patient's allergies indicates:  No Known Allergies  Vitals:    12/10/24 1410   BP: (!) 136/93   Pulse: 102   Temp: 98.2 °F (36.8 °C)          Review of Systems   Genitourinary:  Negative for hematuria.      Objective:     Physical Exam  Vitals reviewed.   Neurological:      Mental Status: He is alert.   Psychiatric:         Mood and Affect: Mood normal.         Behavior: Behavior normal.         Thought Content: Thought content normal.         Judgment: Judgment normal.        Lab Results   Component Value Date    PSA 14.200 (H) 11/11/2024        CMP  Sodium   Date Value Ref Range Status   12/02/2024 134 (L) 136 - 145 mmol/L Final     Potassium   Date Value Ref Range Status   12/02/2024 4.1 3.5 - 5.1 mmol/L Final     Chloride   Date Value Ref Range Status   12/02/2024 104 98 - 107 mmol/L Final     CO2   Date Value Ref Range Status   12/02/2024 26 22 - 29 mmol/L Final     Glucose   Date Value Ref Range Status   12/02/2024 92 74 - 100 mg/dL Final     BUN   Date Value Ref Range Status   12/02/2024 17 8 - 26 mg/dL Final     Creatinine    Date Value Ref Range Status   12/02/2024 1.04 0.72 - 1.25 mg/dL Final     Calcium   Date Value Ref Range Status   12/02/2024 9.2 8.4 - 10.2 mg/dL Final     Anion Gap   Date Value Ref Range Status   12/02/2024 8 7 - 16 mmol/L Final     eGFR   Date Value Ref Range Status   12/02/2024 84 >=60 mL/min/1.73m2 Final      BMP  Lab Results   Component Value Date     (L) 12/02/2024    K 4.1 12/02/2024     12/02/2024    CO2 26 12/02/2024    BUN 17 12/02/2024    CREATININE 1.04 12/02/2024    CALCIUM 9.2 12/02/2024    ANIONGAP 8 12/02/2024    EGFRNORACEVR 84 12/02/2024

## 2024-12-12 ENCOUNTER — TELEPHONE (OUTPATIENT)
Dept: SURGERY | Facility: CLINIC | Age: 59
End: 2024-12-12
Payer: COMMERCIAL

## 2024-12-12 DIAGNOSIS — K40.90 INGUINAL HERNIA: ICD-10-CM

## 2024-12-12 DIAGNOSIS — K40.90 NON-RECURRENT UNILATERAL INGUINAL HERNIA WITHOUT OBSTRUCTION OR GANGRENE: Primary | ICD-10-CM

## 2024-12-12 RX ORDER — SODIUM CHLORIDE 9 MG/ML
INJECTION, SOLUTION INTRAVENOUS CONTINUOUS
Status: CANCELLED | OUTPATIENT
Start: 2024-12-12

## 2024-12-12 RX ORDER — ONDANSETRON 4 MG/1
8 TABLET, ORALLY DISINTEGRATING ORAL EVERY 8 HOURS PRN
Status: CANCELLED | OUTPATIENT
Start: 2024-12-12

## 2024-12-12 RX ORDER — CEFAZOLIN SODIUM 2 G/50ML
2 SOLUTION INTRAVENOUS
Status: CANCELLED | OUTPATIENT
Start: 2024-12-12

## 2024-12-12 NOTE — TELEPHONE ENCOUNTER
----- Message from Kayleigh sent at 12/12/2024  2:07 PM CST -----  Who Called: Mik Lyles    Caller is requesting assistance/information from provider's office.    Pt states that he is having surgery w Dr. Espinoza on 12/13 but hasn't received time yet. Pt doesn't have an admission on his chart. States its for hernia repair.     Preferred Method of Contact: Phone Call  Patient's Preferred Phone Number on File: [phone]   Best Call Back Number, if different:  Additional Information:

## 2024-12-13 ENCOUNTER — ANESTHESIA (OUTPATIENT)
Dept: SURGERY | Facility: HOSPITAL | Age: 59
End: 2024-12-13
Payer: COMMERCIAL

## 2024-12-13 ENCOUNTER — ANESTHESIA EVENT (OUTPATIENT)
Dept: SURGERY | Facility: HOSPITAL | Age: 59
End: 2024-12-13
Payer: COMMERCIAL

## 2024-12-13 ENCOUNTER — HOSPITAL ENCOUNTER (OUTPATIENT)
Facility: HOSPITAL | Age: 59
Discharge: HOME | End: 2024-12-13
Attending: SURGERY | Admitting: SURGERY
Payer: COMMERCIAL

## 2024-12-13 VITALS
HEART RATE: 76 BPM | SYSTOLIC BLOOD PRESSURE: 132 MMHG | OXYGEN SATURATION: 95 % | DIASTOLIC BLOOD PRESSURE: 90 MMHG | BODY MASS INDEX: 27.47 KG/M2 | HEIGHT: 73 IN | WEIGHT: 207.25 LBS | TEMPERATURE: 98 F

## 2024-12-13 DIAGNOSIS — K40.90 INGUINAL HERNIA: Primary | ICD-10-CM

## 2024-12-13 PROCEDURE — 27000510 HC BLANKET BAIR HUGGER ANY SIZE: Performed by: NURSE ANESTHETIST, CERTIFIED REGISTERED

## 2024-12-13 PROCEDURE — 25000003 PHARM REV CODE 250: Performed by: SURGERY

## 2024-12-13 PROCEDURE — 27000509 HC TUBE SALEM SUMP ANY SIZE: Performed by: NURSE ANESTHETIST, CERTIFIED REGISTERED

## 2024-12-13 PROCEDURE — 63600175 PHARM REV CODE 636 W HCPCS: Performed by: NURSE ANESTHETIST, CERTIFIED REGISTERED

## 2024-12-13 PROCEDURE — 27000165 HC TUBE, ETT CUFFED: Performed by: NURSE ANESTHETIST, CERTIFIED REGISTERED

## 2024-12-13 PROCEDURE — 25000242 PHARM REV CODE 250 ALT 637 W/ HCPCS: Performed by: NURSE ANESTHETIST, CERTIFIED REGISTERED

## 2024-12-13 PROCEDURE — 25000003 PHARM REV CODE 250: Performed by: NURSE ANESTHETIST, CERTIFIED REGISTERED

## 2024-12-13 PROCEDURE — 27202344 HC EYESHIELD: Performed by: NURSE ANESTHETIST, CERTIFIED REGISTERED

## 2024-12-13 PROCEDURE — 71000015 HC POSTOP RECOV 1ST HR: Performed by: SURGERY

## 2024-12-13 PROCEDURE — 63600175 PHARM REV CODE 636 W HCPCS: Mod: JZ,JG | Performed by: SURGERY

## 2024-12-13 PROCEDURE — 37000009 HC ANESTHESIA EA ADD 15 MINS: Performed by: SURGERY

## 2024-12-13 PROCEDURE — 27000716 HC OXISENSOR PROBE, ANY SIZE: Performed by: NURSE ANESTHETIST, CERTIFIED REGISTERED

## 2024-12-13 PROCEDURE — 27000689 HC BLADE LARYNGOSCOPE ANY SIZE: Performed by: NURSE ANESTHETIST, CERTIFIED REGISTERED

## 2024-12-13 PROCEDURE — 37000008 HC ANESTHESIA 1ST 15 MINUTES: Performed by: SURGERY

## 2024-12-13 PROCEDURE — C1781 MESH (IMPLANTABLE): HCPCS | Performed by: SURGERY

## 2024-12-13 PROCEDURE — 36000711: Performed by: SURGERY

## 2024-12-13 PROCEDURE — 36000710: Performed by: SURGERY

## 2024-12-13 PROCEDURE — 27000655: Performed by: NURSE ANESTHETIST, CERTIFIED REGISTERED

## 2024-12-13 PROCEDURE — 49650 LAP ING HERNIA REPAIR INIT: CPT | Mod: LT,,, | Performed by: SURGERY

## 2024-12-13 PROCEDURE — 63600175 PHARM REV CODE 636 W HCPCS: Performed by: ANESTHESIOLOGY

## 2024-12-13 PROCEDURE — 71000033 HC RECOVERY, INTIAL HOUR: Performed by: SURGERY

## 2024-12-13 PROCEDURE — 71000016 HC POSTOP RECOV ADDL HR: Performed by: SURGERY

## 2024-12-13 DEVICE — LAPAROSCOPIC SELF-FIXATING MESH POLYESTER WITH POLYLACTIC ACID GRIPS AND COLLAGEN FILM
Type: IMPLANTABLE DEVICE | Site: INGUINAL | Status: FUNCTIONAL
Brand: PROGRIP

## 2024-12-13 RX ORDER — ROCURONIUM BROMIDE 10 MG/ML
INJECTION, SOLUTION INTRAVENOUS
Status: DISCONTINUED | OUTPATIENT
Start: 2024-12-13 | End: 2024-12-13

## 2024-12-13 RX ORDER — IPRATROPIUM BROMIDE AND ALBUTEROL SULFATE 2.5; .5 MG/3ML; MG/3ML
3 SOLUTION RESPIRATORY (INHALATION)
Status: DISCONTINUED | OUTPATIENT
Start: 2024-12-13 | End: 2024-12-13 | Stop reason: HOSPADM

## 2024-12-13 RX ORDER — SODIUM CHLORIDE 9 MG/ML
INJECTION, SOLUTION INTRAVENOUS CONTINUOUS
Status: DISCONTINUED | OUTPATIENT
Start: 2024-12-13 | End: 2024-12-13 | Stop reason: HOSPADM

## 2024-12-13 RX ORDER — ALBUTEROL SULFATE 90 UG/1
INHALANT RESPIRATORY (INHALATION)
Status: DISCONTINUED | OUTPATIENT
Start: 2024-12-13 | End: 2024-12-13

## 2024-12-13 RX ORDER — LIDOCAINE HYDROCHLORIDE 20 MG/ML
INJECTION, SOLUTION EPIDURAL; INFILTRATION; INTRACAUDAL; PERINEURAL
Status: DISCONTINUED | OUTPATIENT
Start: 2024-12-13 | End: 2024-12-13

## 2024-12-13 RX ORDER — OXYCODONE HYDROCHLORIDE 5 MG/1
5 TABLET ORAL
Status: DISCONTINUED | OUTPATIENT
Start: 2024-12-13 | End: 2024-12-13 | Stop reason: HOSPADM

## 2024-12-13 RX ORDER — MIDAZOLAM HYDROCHLORIDE 1 MG/ML
INJECTION INTRAMUSCULAR; INTRAVENOUS
Status: DISCONTINUED | OUTPATIENT
Start: 2024-12-13 | End: 2024-12-13

## 2024-12-13 RX ORDER — BUPIVACAINE HYDROCHLORIDE 2.5 MG/ML
INJECTION, SOLUTION EPIDURAL; INFILTRATION; INTRACAUDAL
Status: DISCONTINUED | OUTPATIENT
Start: 2024-12-13 | End: 2024-12-13 | Stop reason: HOSPADM

## 2024-12-13 RX ORDER — ONDANSETRON 4 MG/1
8 TABLET, ORALLY DISINTEGRATING ORAL EVERY 8 HOURS PRN
Status: DISCONTINUED | OUTPATIENT
Start: 2024-12-13 | End: 2024-12-13 | Stop reason: HOSPADM

## 2024-12-13 RX ORDER — ONDANSETRON HYDROCHLORIDE 2 MG/ML
4 INJECTION, SOLUTION INTRAVENOUS DAILY PRN
Status: DISCONTINUED | OUTPATIENT
Start: 2024-12-13 | End: 2024-12-13 | Stop reason: HOSPADM

## 2024-12-13 RX ORDER — CEFAZOLIN 2 G/1
2 INJECTION, POWDER, FOR SOLUTION INTRAMUSCULAR; INTRAVENOUS
Status: DISCONTINUED | OUTPATIENT
Start: 2024-12-13 | End: 2024-12-13 | Stop reason: HOSPADM

## 2024-12-13 RX ORDER — MEPERIDINE HYDROCHLORIDE 25 MG/ML
25 INJECTION INTRAMUSCULAR; INTRAVENOUS; SUBCUTANEOUS EVERY 10 MIN PRN
Status: DISCONTINUED | OUTPATIENT
Start: 2024-12-13 | End: 2024-12-13 | Stop reason: HOSPADM

## 2024-12-13 RX ORDER — SODIUM CHLORIDE, SODIUM LACTATE, POTASSIUM CHLORIDE, CALCIUM CHLORIDE 600; 310; 30; 20 MG/100ML; MG/100ML; MG/100ML; MG/100ML
125 INJECTION, SOLUTION INTRAVENOUS CONTINUOUS
Status: DISCONTINUED | OUTPATIENT
Start: 2024-12-13 | End: 2024-12-13 | Stop reason: HOSPADM

## 2024-12-13 RX ORDER — FENTANYL CITRATE 50 UG/ML
INJECTION, SOLUTION INTRAMUSCULAR; INTRAVENOUS
Status: DISCONTINUED | OUTPATIENT
Start: 2024-12-13 | End: 2024-12-13

## 2024-12-13 RX ORDER — HYDROMORPHONE HYDROCHLORIDE 2 MG/ML
0.5 INJECTION, SOLUTION INTRAMUSCULAR; INTRAVENOUS; SUBCUTANEOUS EVERY 5 MIN PRN
Status: DISCONTINUED | OUTPATIENT
Start: 2024-12-13 | End: 2024-12-13 | Stop reason: HOSPADM

## 2024-12-13 RX ORDER — ONDANSETRON HYDROCHLORIDE 2 MG/ML
INJECTION, SOLUTION INTRAVENOUS
Status: DISCONTINUED | OUTPATIENT
Start: 2024-12-13 | End: 2024-12-13

## 2024-12-13 RX ORDER — DIPHENHYDRAMINE HYDROCHLORIDE 50 MG/ML
25 INJECTION INTRAMUSCULAR; INTRAVENOUS EVERY 6 HOURS PRN
Status: DISCONTINUED | OUTPATIENT
Start: 2024-12-13 | End: 2024-12-13 | Stop reason: HOSPADM

## 2024-12-13 RX ORDER — PROPOFOL 10 MG/ML
VIAL (ML) INTRAVENOUS
Status: DISCONTINUED | OUTPATIENT
Start: 2024-12-13 | End: 2024-12-13

## 2024-12-13 RX ORDER — HYDROCODONE BITARTRATE AND ACETAMINOPHEN 7.5; 325 MG/1; MG/1
1 TABLET ORAL EVERY 6 HOURS PRN
Qty: 15 TABLET | Refills: 0 | Status: SHIPPED | OUTPATIENT
Start: 2024-12-13

## 2024-12-13 RX ORDER — MORPHINE SULFATE 10 MG/ML
4 INJECTION INTRAMUSCULAR; INTRAVENOUS; SUBCUTANEOUS EVERY 5 MIN PRN
Status: DISCONTINUED | OUTPATIENT
Start: 2024-12-13 | End: 2024-12-13 | Stop reason: HOSPADM

## 2024-12-13 RX ORDER — CEFAZOLIN SODIUM 1 G/3ML
INJECTION, POWDER, FOR SOLUTION INTRAMUSCULAR; INTRAVENOUS
Status: DISCONTINUED | OUTPATIENT
Start: 2024-12-13 | End: 2024-12-13

## 2024-12-13 RX ORDER — KETOROLAC TROMETHAMINE 30 MG/ML
INJECTION, SOLUTION INTRAMUSCULAR; INTRAVENOUS
Status: DISCONTINUED | OUTPATIENT
Start: 2024-12-13 | End: 2024-12-13

## 2024-12-13 RX ORDER — DEXAMETHASONE SODIUM PHOSPHATE 4 MG/ML
INJECTION, SOLUTION INTRA-ARTICULAR; INTRALESIONAL; INTRAMUSCULAR; INTRAVENOUS; SOFT TISSUE
Status: DISCONTINUED | OUTPATIENT
Start: 2024-12-13 | End: 2024-12-13

## 2024-12-13 RX ADMIN — PROPOFOL 150 MG: 10 INJECTION, EMULSION INTRAVENOUS at 09:12

## 2024-12-13 RX ADMIN — LIDOCAINE HYDROCHLORIDE 100 MG: 20 INJECTION, SOLUTION EPIDURAL; INFILTRATION; INTRACAUDAL; PERINEURAL at 09:12

## 2024-12-13 RX ADMIN — DEXAMETHASONE SODIUM PHOSPHATE 10 MG: 4 INJECTION, SOLUTION INTRA-ARTICULAR; INTRALESIONAL; INTRAMUSCULAR; INTRAVENOUS; SOFT TISSUE at 09:12

## 2024-12-13 RX ADMIN — ROCURONIUM BROMIDE 10 MG: 10 INJECTION, SOLUTION INTRAVENOUS at 10:12

## 2024-12-13 RX ADMIN — KETOROLAC TROMETHAMINE 30 MG: 30 INJECTION, SOLUTION INTRAMUSCULAR at 10:12

## 2024-12-13 RX ADMIN — ONDANSETRON 4 MG: 2 INJECTION INTRAMUSCULAR; INTRAVENOUS at 09:12

## 2024-12-13 RX ADMIN — ALBUTEROL SULFATE 8 PUFF: 108 INHALANT RESPIRATORY (INHALATION) at 09:12

## 2024-12-13 RX ADMIN — FENTANYL CITRATE 100 MCG: 50 INJECTION, SOLUTION INTRAMUSCULAR; INTRAVENOUS at 09:12

## 2024-12-13 RX ADMIN — SODIUM CHLORIDE: 9 INJECTION, SOLUTION INTRAVENOUS at 09:12

## 2024-12-13 RX ADMIN — SUGAMMADEX 200 MG: 100 INJECTION, SOLUTION INTRAVENOUS at 10:12

## 2024-12-13 RX ADMIN — MIDAZOLAM HYDROCHLORIDE 2 MG: 1 INJECTION, SOLUTION INTRAMUSCULAR; INTRAVENOUS at 09:12

## 2024-12-13 RX ADMIN — CEFAZOLIN 2 G: 1 INJECTION, POWDER, FOR SOLUTION INTRAMUSCULAR; INTRAVENOUS; PARENTERAL at 09:12

## 2024-12-13 RX ADMIN — ROCURONIUM BROMIDE 40 MG: 10 INJECTION, SOLUTION INTRAVENOUS at 09:12

## 2024-12-13 RX ADMIN — ONDANSETRON 4 MG: 2 INJECTION INTRAMUSCULAR; INTRAVENOUS at 11:12

## 2024-12-13 NOTE — OP NOTE
[facility] - Periop Services  Surgery Department  Operative Note    SUMMARY     Date of Procedure: 12/13/2024     Procedure: Procedure(s) (LRB):  XI ROBOTIC REPAIR, HERNIA, INGUINAL (Left)     Surgeons and Role:     * Rene Espinoza MD - Primary    Assisting Surgeon: None    Pre-Operative Diagnosis: Non-recurrent unilateral inguinal hernia without obstruction or gangrene [K40.90]    Post-Operative Diagnosis: Post-Op Diagnosis Codes:     * Non-recurrent unilateral inguinal hernia without obstruction or gangrene [K40.90]    Anesthesia: General    Procedures Performed: Robotic left inguinal hernia    Significant Findings of the Procedure: large left direct inguinal hernia    Procedure in Detail:  After informed consent was obtained patient was brought to the OR prepped and draped in the usual sterile fashion. We started off by optically inserting a 5 mm trocar at the epigastric area. Pneumoperitoneum was obtained to 15 mmHg of pressure. We then under direct visualization placed an additional 8 mm trocar in the left upper quadrant and an additional one in the right upper quadrant. We replaced our 5 mm trochar with an 8 mm under direct visualization as well. We then placed the patient in Trendelenburg and docked the robot. We placed a fenestrated bipolar in port #1, the camera and port #2, and a scissors with heat in port #3.   We inspected the patient's left inguinal area and could appreciate an large direct hernia there. The other was site was inspected and no hernia seen.  We began began by starting about 4 cm superior to this area and dissected out in the preperitoneal space. We went out laterally enough to encompass the entire area. We took down the medial umbilical ligament and stayed against the preperitoneal lining. The epigastrics were seen identified and protected. We did a complete iliopubic tract dissection to open up the direct space. The indirect sac was also reduced back into the abdominal  cavity. The vas deferens and spermatic vessels were also protected. Iliac vessels were also protected. We went low enough to have the mesh lay flat without any redundancy. We then placed a 10x15 cm Progrip mesh then laid it flat against the entire area to cover with no creases in the mesh. We then used a strata fix 2-0 suture to reperitonealize the peritoneum. We then removed both right needles inspected our work and found it was hemostatic and intact. We then discontinued pneumoperitoneum and undocked the robot. We closed the skin was a 4 Monocryl in a subcuticular manner and injected local. Patient tolerated the procedure well.      Complications: No    Estimated Blood Loss (EBL): 5 cc           Implants:   Implant Name Type Inv. Item Serial No.  Lot No. LRB No. Used Action   MESH LAP PG 10X15 DF FLATSHEET - [id number]  MESH LAP PG 10X15 DF FLATSHEET  COVIDIEN [id number] Left 1 Implanted       Specimens:   Specimen (24h ago, onward)      None                    Condition: Good    Disposition: PACU - hemodynamically stable.    Attestation: I was present and scrubbed for the entire procedure.

## 2024-12-13 NOTE — INTERVAL H&P NOTE
The patient has been examined and the H&P has been reviewed:    I concur with the findings and no changes have occurred since H&P was written.    Surgery risks, benefits and alternative options discussed and understood by patient/family.    XRT for prostate cancer.  Risks and benefits explained.  All questions answered      There are no hospital problems to display for this patient.

## 2024-12-13 NOTE — TRANSFER OF CARE
"Anesthesia Transfer of Care Note    Patient: Mik Lyles    Procedure(s) Performed: Procedure(s) (LRB):  XI ROBOTIC REPAIR, HERNIA, INGUINAL (Left)    Patient location: PACU    Anesthesia Type: general    Transport from OR: Transported from OR on room air with adequate spontaneous ventilation    Post pain: adequate analgesia    Post assessment: no apparent anesthetic complications    Post vital signs: stable    Level of consciousness: awake and alert    Nausea/Vomiting: no nausea/vomiting    Complications: none    Transfer of care protocol was followed      Last vitals: Visit Vitals  BP (!) 124/90   Pulse 70   Temp 36.6 °C (97.8 °F)   Resp 20   Ht 6' 1" (1.854 m)   Wt 94 kg (207 lb 3.7 oz)   SpO2 (!) 91%   BMI 27.34 kg/m²     "

## 2024-12-13 NOTE — PLAN OF CARE
1055 RECEIVED RESTING QUIETLY NO DISTRESS NOTED, BANDAGES X3 TO ABDOMEN. DENIES PAIN AT PRESENT. WILL CONTINUE TO MONITOR.    1110 COUSIN UPDATED ON PATIENT STATUS    1135 TRANSFERRED TO ASC #6 NO DISTRESS NOTED, DENIES COMPLAINTS. COUSIN AT BEDSIDE. REPORT GIVEN TO ALETHA JIMENEZ 126/87 72 93%

## 2024-12-13 NOTE — ANESTHESIA PROCEDURE NOTES
Intubation    Date/Time: 12/13/2024 9:34 AM    Performed by: Delano De La Cruz CRNA  Authorized by: Wolfgang Carrington DO    Intubation:     Induction:  Intravenous    Intubated:  Postinduction    Mask Ventilation:  Easy mask    Attempts:  1    Attempted By:  CRNA    Method of Intubation:  Direct    Blade:  Leatha 4    Laryngeal View Grade: Grade III - only epiglottis visible      Difficult Airway Encountered?: No      Complications:  None    Airway Device:  Oral endotracheal tube    Airway Device Size:  7.5    Style/Cuff Inflation:  Cuffed (inflated to minimal occlusive pressure)    Inflation Amount (mL):  7    Tube secured:  21    Placement Verified By:  Capnometry    Complicating Factors:  None    Findings Post-Intubation:  BS equal bilateral and atraumatic/condition of teeth unchanged

## 2024-12-13 NOTE — ANESTHESIA PREPROCEDURE EVALUATION
12/13/2024  Mik Lyles is a 59 y.o., male.      Pre-op Assessment    I have reviewed the Patient Summary Reports.     I have reviewed the Nursing Notes. I have reviewed the NPO Status.   I have reviewed the Medications.     Review of Systems  Anesthesia Hx:  No problems with previous Anesthesia             Denies Family Hx of Anesthesia complications.    Denies Personal Hx of Anesthesia complications.                    Social:  Non-Smoker, No Alcohol Use, Former Smoker       Hematology/Oncology:  Hematology Normal   Oncology Normal                               Oncology Comments: Current and recent diagnosis prostate Ca - has not started treatment     EENT/Dental:  EENT/Dental Normal           Cardiovascular:  Cardiovascular Normal Exercise tolerance: good                 ECG has been reviewed.                            Pulmonary:   COPD, mild                     Renal/:  Renal/ Normal                 Hepatic/GI:  Hepatic/GI Normal       Non-recurrent unilateral inguinal hernia without obstruction or gangrene [K40.90]             Musculoskeletal:  Musculoskeletal Normal                Neurological:  Neurology Normal                                      Endocrine:  Endocrine Normal            Dermatological:  Skin Normal    Psych:  Psychiatric Normal                  Past Medical History:   Diagnosis Date    COPD (chronic obstructive pulmonary disease)      Past Surgical History:   Procedure Laterality Date    BIOPSY, PROSTATE, USING PROSTATE MAPPING Bilateral 12/6/2024    Procedure: BIOPSY PROSTATE USING PROSTATE MAPPING;  Surgeon: Rohit Bales MD;  Location: [facility];  Service: Urology;  Laterality: Bilateral;    KNEE SURGERY Left          Physical Exam  General: Well nourished    Airway:  Mallampati: II / II  Mouth Opening: Normal  TM Distance: > 6 cm  Tongue: Normal  Neck ROM: Normal  ROM    Dental:  Intact    Chest/Lungs:  Clear to auscultation, Normal Respiratory Rate    Heart:  Rate: Normal  Rhythm: Regular Rhythm        Chemistry        Component Value Date/Time     (L) 12/02/2024 1038    K 4.1 12/02/2024 1038     12/02/2024 1038    CO2 26 12/02/2024 1038    BUN 17 12/02/2024 1038    CREATININE 1.04 12/02/2024 1038    GLU 92 12/02/2024 1038        Component Value Date/Time    CALCIUM 9.2 12/02/2024 1038        Lab Results   Component Value Date    WBC 11.32 (H) 12/02/2024    HGB 14.9 12/02/2024    HCT 45.3 12/02/2024     12/02/2024     Results for orders placed or performed in visit on 12/02/24   EKG 12-lead    Collection Time: 12/02/24 10:27 AM   Result Value Ref Range    QRS Duration 78 ms    [facility] QTC Calculation 399 ms    Narrative    Test Reason : Z01.810,    Vent. Rate :  83 BPM     Atrial Rate :  83 BPM     P-R Int : 198 ms          QRS Dur :  78 ms      QT Int : 340 ms       P-R-T Axes :  66  30  49 degrees    QTcB Int : 399 ms    Normal sinus rhythm  Normal ECG  No previous ECGs available  Confirmed by Roldan Berry ([id number]) on 12/4/2024 1:10:50 PM    Referred By: LOIS CAMPBELL           Confirmed By: Roldan Berry         Anesthesia Plan  Type of Anesthesia, risks & benefits discussed:    Anesthesia Type: Gen ETT  Intra-op Monitoring Plan: Standard ASA Monitors  Post Op Pain Control Plan: multimodal analgesia  Induction:  IV  Airway Plan: Direct, Post-Induction  Informed Consent: Informed consent signed with the Patient and all parties understand the risks and agree with anesthesia plan.  All questions answered. Patient consented to blood products? Yes  ASA Score: 3  Day of Surgery Review of History & Physical: H&P Update referred to the surgeon/provider.I have interviewed and examined the patient. I have reviewed the patient's H&P dated: There are no significant changes.     Ready For Surgery From Anesthesia Perspective.     .

## 2024-12-13 NOTE — ANESTHESIA POSTPROCEDURE EVALUATION
Anesthesia Post Evaluation    Patient: Mik Lyles    Procedure(s) Performed: Procedure(s) (LRB):  XI ROBOTIC REPAIR, HERNIA, INGUINAL (Left)    Final Anesthesia Type: general      Patient location during evaluation: PACU  Patient participation: Yes- Able to Participate  Level of consciousness: awake and alert and oriented  Post-procedure vital signs: reviewed and stable  Pain management: adequate  Airway patency: patent  RENETTA mitigation strategies: Multimodal analgesia  PONV status at discharge: No PONV  Anesthetic complications: no      Cardiovascular status: hemodynamically stable  Respiratory status: unassisted and spontaneous ventilation  Hydration status: euvolemic  Follow-up not needed.              Vitals Value Taken Time   /87 12/13/24 1145   Temp 36.6 °C (97.8 °F) 12/13/24 1058   Pulse 71 12/13/24 1151   Resp 15 12/13/24 1139   SpO2 96 % 12/13/24 1151   Vitals shown include unfiled device data.      Event Time   Out of Recovery 11:30:00         Pain/Mic Score: Mic Score: 10 (12/13/2024 11:40 AM)

## 2024-12-13 NOTE — DISCHARGE SUMMARY
[facility] - Periop Services  Discharge Note  Short Stay    Procedure(s) (LRB):  XI ROBOTIC REPAIR, HERNIA, INGUINAL (Left)      OUTCOME: Patient tolerated treatment/procedure well without complication and is now ready for discharge.    DISPOSITION: Home or Self Care    FINAL DIAGNOSIS:  Non-recurrent unilateral inguinal hernia without obstruction or gangrene    FOLLOWUP: In clinic    DISCHARGE INSTRUCTIONS:    Discharge Procedure Orders   Diet Adult Regular     Lifting restrictions   Order Comments: No heavier than 10 lbs for 3 weeks     Remove dressing in 24 hours     Notify your health care provider if you experience any of the following:  temperature >100.4     Notify your health care provider if you experience any of the following:  persistent nausea and vomiting or diarrhea     Notify your health care provider if you experience any of the following:  severe uncontrolled pain     Notify your health care provider if you experience any of the following:  redness, tenderness, or signs of infection (pain, swelling, redness, odor or green/yellow discharge around incision site)     Notify your health care provider if you experience any of the following:  difficulty breathing or increased cough     Notify your health care provider if you experience any of the following:  worsening rash     Notify your health care provider if you experience any of the following:  persistent dizziness, light-headedness, or visual disturbances     Notify your health care provider if you experience any of the following:  increased confusion or weakness        TIME SPENT ON DISCHARGE: 5 minutes

## 2024-12-16 DIAGNOSIS — C61 PROSTATE CANCER: Primary | ICD-10-CM

## 2024-12-26 ENCOUNTER — HOSPITAL ENCOUNTER (OUTPATIENT)
Dept: RADIOLOGY | Facility: HOSPITAL | Age: 59
Discharge: HOME | End: 2024-12-26
Attending: SURGERY
Payer: COMMERCIAL

## 2024-12-26 ENCOUNTER — OFFICE VISIT (OUTPATIENT)
Dept: SURGERY | Facility: CLINIC | Age: 59
End: 2024-12-26
Attending: SURGERY
Payer: COMMERCIAL

## 2024-12-26 VITALS — BODY MASS INDEX: 27.83 KG/M2 | HEIGHT: 73 IN | WEIGHT: 210 LBS

## 2024-12-26 DIAGNOSIS — Z98.890 POST-OPERATIVE STATE: Primary | ICD-10-CM

## 2024-12-26 DIAGNOSIS — Z98.890 POST-OPERATIVE STATE: ICD-10-CM

## 2024-12-26 PROCEDURE — 99999 PR PBB SHADOW E&M-EST. PATIENT-LVL III: CPT | Mod: PBBFAC,,, | Performed by: SURGERY

## 2024-12-26 PROCEDURE — 99024 POSTOP FOLLOW-UP VISIT: CPT | Mod: ,,, | Performed by: SURGERY

## 2024-12-26 PROCEDURE — 1159F MED LIST DOCD IN RCRD: CPT | Mod: CPTII,,, | Performed by: SURGERY

## 2024-12-26 PROCEDURE — 99213 OFFICE O/P EST LOW 20 MIN: CPT | Mod: PBBFAC,25 | Performed by: SURGERY

## 2024-12-26 PROCEDURE — 76882 US LMTD JT/FCL EVL NVASC XTR: CPT | Mod: TC,LT

## 2024-12-26 NOTE — PROGRESS NOTES
Post-operative Note    HPI:  The patient is status post robotic left inguinal hernia repair.  Still has a bulge there was some soreness.  Mild improvement in his urinary issues.  No fever no chills no nausea or vomiting.    PHYSICAL EXAM:    Fluid pocket in the left groin area    Recent Results (from the past 3 weeks)   Surgical Pathology    Collection Time: 12/06/24  2:32 PM   Result Value Ref Range    Case Report       Surgical Pathology                                Case: [id number]                                   Authorizing Provider:  Rohit Bales MD        Collected:           12/06/2024 02:32 PM          Ordering Location:     [facility] -     Received:            12/06/2024 03:24 PM                                 Periop Services                                                              Pathologist:           Chris Campbell MD                                                           Specimens:   A) - Prostate, [place]                                                                                  B) - Prostate, Right Base                                                                           C) - Prostate, Right midline                                                                        D) - Prostate, Right [place]                                                                           E) - Prostate, Left Base                                                                             F) - Prostate, Left Midline                                                                         G) - Prostate, Left [place]                                                                   Final Diagnosis       A. Prostate region of interest, biopsies:  - Halina grade 3+4=7 adenocarcinoma of the prostate involving all 5 biopsy fragments (nearly 100% of the specimen)  - Perineural invasion  - No lymphovascular invasion  seen  - See comments    B. Right base prostate, biopsies:  Benign prostate tissue with no significant microscopic pathologic change    C. Right midline prostate, biopsies:  Benign prostate tissue with no significant microscopic pathologic change    D. Right apex prostate, biopsies:  Benign prostate tissue with no significant microscopic pathologic change    E. Left base prostate, biopsies:  - Halina grade 3+4=7 adenocarcinoma of the prostate involving both biopsy fragments (greater than 80% of the specimen)   - No perineural or lymphovascular invasion seen  - See comments    F. Left midline prostate, biopsy:    - Halina grade 3+3=6 adenocarcinoma of the prostate involving approximately 50% of the specimen  - No perineural or lymphovascular invasion seen  - See comments    G. Left apex prostate, biopsies:  Benign prostate tissue with no significant microscopic pathologic change        Comments       PIN-4 immunohistochemistry is performed on blocks A1, E1, and F1, and it helps to support the malignant diagnoses of all 3 specimens.      Gross Description       A. Prostate: WINSTON  The specimen is received in formalin designated WINSTON and consists of 5 stringy white-tan needle core biopsy fragments that measure from 1.4-1.7 cm in greatest dimension and are entirely submitted in cassette A1.    Grossing was completed by Kory Ferreira.  B. Prostate: Right Base  The specimen is received in formalin designated Right Base and consists of 2 stringy white-tan needle core biopsy fragments that measure from 0.3-1.9 cm in greatest dimension and are entirely submitted in cassette B1.    Grossing was completed by Kory Ferreira.  C. Prostate: Right midline  The specimen is received in formalin designated Right midline and consists of 3 stringy white-tan needle core biopsy fragments that measure from 0.4-1.6 cm in greatest dimension and are entirely submitted in cassette C1.    Grossing was completed by Kory Ferreira.  JESUS  Prostate: Right [place]  The specimen is received in formalin designated Right [place] and consists of 4 stringy white-tan needle core biopsy fragments that measure from 0.3-1.7 cm in greatest dimension and are entirely submitted in cassette D1.    Grossing was completed by Kory Ferreira.  E. Prostate: Left Base  The specimen is received in formalin designated Left Base and consists of 2 stringy white-tan needle core biopsy fragments that each measure 1.7 cm in greatest dimension and are entirely submitted in cassette E1.    Grossing was completed by Kory Ferreira.  F. Prostate: Left Midline  The specimen is received in formalin designated Left Midline and consists of and consists of a single stringy white-tan needle core biopsy fragment that measures 1.2 cm in greatest dimension and is entirely submitted in cassette F1.    Grossing was completed by Kory Ferreira.  G. Prostate: Left [place]  The specimen is received in formalin designated Left [place] and consists of 3 stringy white-tan needle core biopsy fragments that measure from 0.6-1.2 cm in greatest dimension and are entirely submitted in cassette G1.    Grossing was completed by Kory Ferreira.      Microscopic Description       A microscopic examination was performed and the diagnosis reflects the findings.          Laboratory Notes       If this report includes immunohistochemical (IHC) test results, please note the following: IHC studies were interpreted in conjunction with appropriate positive and negative controls which demonstrate the expected positive and negative reactivity. This laboratory is regulated under CLIA as qualified to perform high-complexity testing. IHC tests are used for clinical purposes. They should not be regarded as investigational or research.            ASSESSMENT:      The patient is doing well after surgery.       PLAN:      Follow up PRN.    Ultrasound performed of the left groin showed a seroma there with no actual hernia  like before surgery.  Just a seroma there and patient has minimal/mild symptoms.  Agreed to watch this for now.  If it is still there after couple of months he wants needle aspiration will consider it.  Otherwise the risk of infection and recurrence were explained to him and he will continue to watch it.  He will come back and see me sooner for any issues.  All questions were answered.

## 2024-12-30 DIAGNOSIS — C61 MALIGNANT NEOPLASM OF PROSTATE: Primary | ICD-10-CM

## 2024-12-31 ENCOUNTER — OFFICE VISIT (OUTPATIENT)
Dept: UROLOGY | Facility: CLINIC | Age: 59
End: 2024-12-31
Payer: COMMERCIAL

## 2024-12-31 VITALS
HEART RATE: 95 BPM | WEIGHT: 213 LBS | HEIGHT: 73 IN | SYSTOLIC BLOOD PRESSURE: 127 MMHG | BODY MASS INDEX: 28.23 KG/M2 | DIASTOLIC BLOOD PRESSURE: 82 MMHG

## 2024-12-31 DIAGNOSIS — C61 MALIGNANT NEOPLASM OF PROSTATE: Primary | ICD-10-CM

## 2024-12-31 DIAGNOSIS — C61 PROSTATE CANCER: ICD-10-CM

## 2024-12-31 PROCEDURE — 96402 CHEMO HORMON ANTINEOPL SQ/IM: CPT | Mod: PBBFAC | Performed by: UROLOGY

## 2024-12-31 PROCEDURE — 1160F RVW MEDS BY RX/DR IN RCRD: CPT | Mod: CPTII,,, | Performed by: UROLOGY

## 2024-12-31 PROCEDURE — 1159F MED LIST DOCD IN RCRD: CPT | Mod: CPTII,,, | Performed by: UROLOGY

## 2024-12-31 PROCEDURE — 99999 PR PBB SHADOW E&M-EST. PATIENT-LVL III: CPT | Mod: PBBFAC,,, | Performed by: UROLOGY

## 2024-12-31 PROCEDURE — 3008F BODY MASS INDEX DOCD: CPT | Mod: CPTII,,, | Performed by: UROLOGY

## 2024-12-31 PROCEDURE — 99213 OFFICE O/P EST LOW 20 MIN: CPT | Mod: S$PBB,,, | Performed by: UROLOGY

## 2024-12-31 PROCEDURE — 99213 OFFICE O/P EST LOW 20 MIN: CPT | Mod: PBBFAC | Performed by: UROLOGY

## 2024-12-31 PROCEDURE — 3074F SYST BP LT 130 MM HG: CPT | Mod: CPTII,,, | Performed by: UROLOGY

## 2024-12-31 PROCEDURE — 3079F DIAST BP 80-89 MM HG: CPT | Mod: CPTII,,, | Performed by: UROLOGY

## 2024-12-31 PROCEDURE — 99999PBSHW PR PBB SHADOW TECHNICAL ONLY FILED TO HB: Mod: JZ,PBBFAC,,

## 2024-12-31 RX ADMIN — LEUPROLIDE ACETATE 45 MG: 45 INJECTION, SUSPENSION, EXTENDED RELEASE SUBCUTANEOUS at 12:12

## 2024-12-31 NOTE — PROGRESS NOTES
Assessment:   1. Malignant neoplasm of prostate  -     leuprolide acetate (6 month) (ELIGARD) injection 45 mg  -     Prior authorization Order  -     PSA, Total (Diagnostic); Future; Expected date: 06/30/2025  -     PSA, Total (Diagnostic); Future; Expected date: 03/31/2025    2. Prostate cancer         Plan:     AUA SS: 0,1,1,0,3,0, 2X; QOL: 3  The patient was administered Eligard which is a six-month depot and has plans to follow up with radiation oncology for radiation therapy.  He is scheduled for a return in 6 months for repeat depot and I have asked for him to return in 3 months for a symptom check and a PSA.             Rohit Bales MD  Urology  12/31/2024          UROLOGY HISTORY AND PHYSICAL EXAM    Subjective:      Patient ID: Mik Lyles is a 59 y.o. male.    Chief Complaint::   Follow-up  Pertinent negatives include no abdominal pain.     The patient is a 59-year-old male who presents today reporting that he is scheduled to start radiation therapy in January and is here for Eligard.  He underwent surgical repair of his hernia and reports that he is doing well.       IPSS Questionnaire (AUA-7):  Over the past month    1)  How often have you had a sensation of not emptying your bladder completely after you finish urinating?  0 - Not at all   2)  How often have you had to urinate again less than two hours after you finished urinating? 1 - Less than 1 time in 5   3)  How often have you found you stopped and started again several times when you urinated?  1 - Less than 1 time in 5   4) How difficult have you found it to postpone urination?  0 - Not at all   5) How often have you had a weak urinary stream?  2 - Less than half the time   6) How often have you had to push or strain to begin urination?  0 - Not at all   7) How many times did you most typically get up to urinate from the time you went to bed until the time you got up in the morning?  2 - 2 times   Total score:  0-7 mildly symptomatic     8-19 moderately symptomatic    20-35 severely symptomatic         Past Medical History:   Diagnosis Date    COPD (chronic obstructive pulmonary disease)      Past Surgical History:   Procedure Laterality Date    BIOPSY, PROSTATE, USING PROSTATE MAPPING Bilateral 12/6/2024    Procedure: BIOPSY PROSTATE USING PROSTATE MAPPING;  Surgeon: Rohit Bales MD;  Location: [facility] OR;  Service: Urology;  Laterality: Bilateral;    KNEE SURGERY Left     ROBOT-ASSISTED LAPAROSCOPIC REPAIR OF INGUINAL HERNIA USING DA ANDRE XI Left 12/13/2024    Procedure: XI ROBOTIC REPAIR, HERNIA, INGUINAL;  Surgeon: Rene Espinoza MD;  Location: [facility] OR;  Service: General;  Laterality: Left;        Current Outpatient Medications on File Prior to Visit   Medication Sig Dispense Refill    albuterol sulfate 90 mcg/actuation aebs Inhale 180 mcg into the lungs every 4 (four) hours as needed. prn      budesonide-glycopyr-formoterol (BREZTRI AEROSPHERE) 160-9-4.8 mcg/actuation HFAA Inhale 2 puffs into the lungs 2 (two) times daily.      montelukast (SINGULAIR) 10 mg tablet Take 10 mg by mouth every evening.      albuterol-ipratropium (DUO-NEB) 2.5 mg-0.5 mg/3 mL nebulizer solution Take 3 mLs by nebulization every 6 (six) hours as needed for Wheezing. Rescue (Patient not taking: Reported on 12/31/2024) 75 mL 0    ciprofloxacin HCl (CIPRO) 500 MG tablet Start taking this antibiotic the day prior to your biopsy.  Then take the morning of your biopsy with a small sip of water.  Then continue taking this antibiotic until it is completed. (Patient not taking: Reported on 12/31/2024) 6 tablet 0    HYDROcodone-acetaminophen (NORCO) 7.5-325 mg per tablet Take 1 tablet by mouth every 6 (six) hours as needed for pain....May cause drowsiness. (Patient not taking: Reported on 12/31/2024) 15 tablet 0     No current facility-administered medications on file prior to visit.        Review of patient's allergies indicates:  No Known Allergies  Vitals:     12/31/24 1231   BP: 127/82   Pulse: 95          Review of Systems   Constitutional:  Positive for activity change.   Respiratory:  Negative for shortness of breath.    Gastrointestinal:  Negative for abdominal distention and abdominal pain.   Genitourinary:  Negative for hematuria.   Skin:  Positive for wound.      Objective:     Physical Exam  Vitals reviewed.   Cardiovascular:      Rate and Rhythm: Normal rate.   Pulmonary:      Effort: Pulmonary effort is normal.   Abdominal:      General: There is no distension.      Comments: Mild swelling at the hernia site.    Skin:     General: Skin is warm.   Neurological:      General: No focal deficit present.      Mental Status: He is alert. Mental status is at baseline.   Psychiatric:         Mood and Affect: Mood normal.        Lab Results   Component Value Date    PSA 14.200 (H) 11/11/2024        CMP  Sodium   Date Value Ref Range Status   12/02/2024 134 (L) 136 - 145 mmol/L Final     Potassium   Date Value Ref Range Status   12/02/2024 4.1 3.5 - 5.1 mmol/L Final     Chloride   Date Value Ref Range Status   12/02/2024 104 98 - 107 mmol/L Final     CO2   Date Value Ref Range Status   12/02/2024 26 22 - 29 mmol/L Final     Glucose   Date Value Ref Range Status   12/02/2024 92 74 - 100 mg/dL Final     BUN   Date Value Ref Range Status   12/02/2024 17 8 - 26 mg/dL Final     Creatinine   Date Value Ref Range Status   12/02/2024 1.04 0.72 - 1.25 mg/dL Final     Calcium   Date Value Ref Range Status   12/02/2024 9.2 8.4 - 10.2 mg/dL Final     Anion Gap   Date Value Ref Range Status   12/02/2024 8 7 - 16 mmol/L Final     eGFR   Date Value Ref Range Status   12/02/2024 84 >=60 mL/min/1.73m2 Final      BMP  Lab Results   Component Value Date     (L) 12/02/2024    K 4.1 12/02/2024     12/02/2024    CO2 26 12/02/2024    BUN 17 12/02/2024    CREATININE 1.04 12/02/2024    CALCIUM 9.2 12/02/2024    ANIONGAP 8 12/02/2024    EGFRNORACEVR 84 12/02/2024

## 2025-01-24 ENCOUNTER — OFFICE VISIT (OUTPATIENT)
Dept: PULMONOLOGY | Facility: CLINIC | Age: 60
End: 2025-01-24
Payer: COMMERCIAL

## 2025-01-24 VITALS
WEIGHT: 211.63 LBS | BODY MASS INDEX: 28.05 KG/M2 | HEART RATE: 88 BPM | DIASTOLIC BLOOD PRESSURE: 84 MMHG | OXYGEN SATURATION: 96 % | HEIGHT: 73 IN | RESPIRATION RATE: 16 BRPM | SYSTOLIC BLOOD PRESSURE: 120 MMHG

## 2025-01-24 DIAGNOSIS — R06.00 DYSPNEA, UNSPECIFIED TYPE: ICD-10-CM

## 2025-01-24 DIAGNOSIS — R91.1 SOLITARY PULMONARY NODULE: ICD-10-CM

## 2025-01-24 DIAGNOSIS — J44.89 ASTHMA-COPD OVERLAP SYNDROME: ICD-10-CM

## 2025-01-24 DIAGNOSIS — F17.211 CIGARETTE NICOTINE DEPENDENCE IN REMISSION: Primary | ICD-10-CM

## 2025-01-24 PROCEDURE — 99214 OFFICE O/P EST MOD 30 MIN: CPT | Mod: PBBFAC | Performed by: STUDENT IN AN ORGANIZED HEALTH CARE EDUCATION/TRAINING PROGRAM

## 2025-01-24 PROCEDURE — 3074F SYST BP LT 130 MM HG: CPT | Mod: CPTII,,, | Performed by: STUDENT IN AN ORGANIZED HEALTH CARE EDUCATION/TRAINING PROGRAM

## 2025-01-24 PROCEDURE — 99214 OFFICE O/P EST MOD 30 MIN: CPT | Mod: S$PBB,,, | Performed by: STUDENT IN AN ORGANIZED HEALTH CARE EDUCATION/TRAINING PROGRAM

## 2025-01-24 PROCEDURE — 3079F DIAST BP 80-89 MM HG: CPT | Mod: CPTII,,, | Performed by: STUDENT IN AN ORGANIZED HEALTH CARE EDUCATION/TRAINING PROGRAM

## 2025-01-24 PROCEDURE — 1159F MED LIST DOCD IN RCRD: CPT | Mod: CPTII,,, | Performed by: STUDENT IN AN ORGANIZED HEALTH CARE EDUCATION/TRAINING PROGRAM

## 2025-01-24 PROCEDURE — 3008F BODY MASS INDEX DOCD: CPT | Mod: CPTII,,, | Performed by: STUDENT IN AN ORGANIZED HEALTH CARE EDUCATION/TRAINING PROGRAM

## 2025-01-24 PROCEDURE — 99999 PR PBB SHADOW E&M-EST. PATIENT-LVL IV: CPT | Mod: PBBFAC,,, | Performed by: STUDENT IN AN ORGANIZED HEALTH CARE EDUCATION/TRAINING PROGRAM

## 2025-01-24 NOTE — PROGRESS NOTES
Patient Name: Mik Lyles   Primary Care Provider: Damaris Salazar FNP-C   Date of Service:  11/11/24   Reason for Referral:  Pulmonary nodule    Chief Complaint: Pulmonary Nodules (Follow up PET Scan. )      Subjective:      Mik Lyles is 59 y.o. male with past medical history significant for smoking who now presents in the setting of shortness of breath and pulmonary nodule      Follow up clinic visit 1/24/25   I reviewed his PET-CT scan myself which showed no evidence of FDG activity in the right middle lobe lung nodule.  In the interim, he has now quit smoking after our efforts previously.  Continues on occasional DuoNebs and albuterol and daily breztri.      Follow up clinic visit 11/11/24   Asthma/COPD overlap syndrome on recent pulmonary function testing.  I reviewed the patient's CT chest with stable lung nodule present in the right middle lobe.  Experiencing exacerbation with increased expiratory wheezing, and increased exacerbation with ambulation of the patient's work (works at a [employer]).  Has been using more nebulized albuterol.      Initial clinic visit 9/9/24  5.8% risk   COPD, started nebulized medication with albuterol and inhaled corticosteroids, does not remember all medication .  Significant improvement  Low risk .  I personally reviewed the patient's CT chest and noted the lung nodule in the right middle lobe, and I agree that this may be an intrapulmonary lymph node.    Follow up November 11   Willingness to quit, 3-6 cigars per day  Dyspneic with exertion but able to walk up multiple flights of stairs daily   No wheezing on exam      Assessment and Plan      Solitary pulmonary nodule, stable   Asthma/COPD overlap syndrome  Dyspnea with exertion      Assessment:  Evidence of asthma/COPD overlap syndrome on pulmonary function testing, does not require oxygen with 6 minute walk test.  Exacerbation resolved now, markedly improved, since he quit smoking wheezing has also  significantly reduced.  Also reviewed patient's pulmonary nodule which is stable at this time with no FDG activity.    Plan  Repeat CT low-dose in 1 year's time  Continue with triple inhaled therapy  Continue to use albuterol/nebulizer for rescue   Counseled to call the clinic or go to the emergency department/call 911 in the event of worsening symptoms or any other red flag signs/symptoms as explained to the patient in detail    Nicotine dependence, cigarettes , now in remission  Tobacco cessation counseling      Assessment:  Patient has now successfully quit after intervention last time.  Patient congratulated and counseled on remaining abstinent from nicotine.    Plan  We will continue to monitor progress on next visit     Lung cancer screening      Assessment:  The patient meets criteria for lung cancer screening (has a greater than 20 pack-year history of smoking, is 59  years old and has not quit more than 15 years ago).  We had a discussion regarding what lung cancer screening entails and after shared decision-making, the patient agreed to low-dose CT scan.    Plan  Low-dose CT scan for lung cancer screening in 1 year's time    Red Stuart MD  Interventional Pulmonary and Critical Care  [facility]      Problem List Items Addressed This Visit    None            Past Medical History:   Diagnosis Date    COPD (chronic obstructive pulmonary disease)       Past Surgical History:   Procedure Laterality Date    BIOPSY, PROSTATE, USING PROSTATE MAPPING Bilateral 12/6/2024    Procedure: BIOPSY PROSTATE USING PROSTATE MAPPING;  Surgeon: Rohit Bales MD;  Location: [facility] OR;  Service: Urology;  Laterality: Bilateral;    KNEE SURGERY Left     ROBOT-ASSISTED LAPAROSCOPIC REPAIR OF INGUINAL HERNIA USING DA ANDRE XI Left 12/13/2024    Procedure: XI ROBOTIC REPAIR, HERNIA, INGUINAL;  Surgeon: Rene Espinoza MD;  Location: [facility] OR;  Service: General;  Laterality: Left;     Family History  "  Problem Relation Name Age of Onset    Hypertension Mother      Cancer Father       Review of patient's allergies indicates:  No Known Allergies   Social History     Tobacco Use    Smoking status: Former     Types: Cigars     Start date: 1/1/1985     Passive exposure: Never    Smokeless tobacco: Never    Tobacco comments:     Quit one week ago.    Substance Use Topics    Alcohol use: Yes    Drug use: Yes     Types: Marijuana      Review of Systems       Objective:      Physical Exam  Constitutional:       General: He is not in acute distress.     Appearance: Normal appearance. He is normal weight. He is not ill-appearing or diaphoretic.   HENT:      Head: Normocephalic and atraumatic.      Nose: No congestion or rhinorrhea.      Mouth/Throat:      Mouth: Mucous membranes are moist.   Cardiovascular:      Rate and Rhythm: Normal rate and regular rhythm.      Pulses: Normal pulses.      Heart sounds: Normal heart sounds.   Pulmonary:      Effort: Pulmonary effort is normal. No respiratory distress.      Breath sounds: No stridor. No wheezing, rhonchi or rales.   Chest:      Chest wall: No tenderness.   Abdominal:      General: Abdomen is flat.   Musculoskeletal:      Cervical back: Normal range of motion. No rigidity.      Right lower leg: No edema.      Left lower leg: No edema.   Skin:     General: Skin is warm.      Findings: No erythema.   Neurological:      General: No focal deficit present.      Mental Status: He is alert and oriented to person, place, and time. Mental status is at baseline.   Psychiatric:         Mood and Affect: Mood normal.         Behavior: Behavior normal.         Thought Content: Thought content normal.                1/24/2025    10:19 AM 12/31/2024    12:31 PM 12/26/2024     9:46 AM 12/13/2024    12:00 PM 12/13/2024    11:45 AM 12/13/2024    11:35 AM 12/13/2024    11:25 AM   Pulmonary Function Tests   SpO2 96 %   95 % 94 % 92 % 92 %   Height 6' 1" (1.854 m) 6' 1" (1.854 m) 6' 1" (1.854 " m)       Weight 96 kg (211 lb 10.3 oz) 96.6 kg (213 lb) 95.3 kg (210 lb)       BMI (Calculated) 27.9 28.1 27.7             Outpatient Encounter Medications as of 1/24/2025   Medication Sig Dispense Refill    albuterol sulfate 90 mcg/actuation aebs Inhale 180 mcg into the lungs every 4 (four) hours as needed. prn      budesonide-glycopyr-formoterol (BREZTRI AEROSPHERE) 160-9-4.8 mcg/actuation HFAA Inhale 2 puffs into the lungs 2 (two) times daily.      montelukast (SINGULAIR) 10 mg tablet Take 10 mg by mouth every evening.      albuterol-ipratropium (DUO-NEB) 2.5 mg-0.5 mg/3 mL nebulizer solution Take 3 mLs by nebulization every 6 (six) hours as needed for Wheezing. Rescue (Patient not taking: Reported on 1/24/2025) 75 mL 0    ciprofloxacin HCl (CIPRO) 500 MG tablet Start taking this antibiotic the day prior to your biopsy.  Then take the morning of your biopsy with a small sip of water.  Then continue taking this antibiotic until it is completed. (Patient not taking: Reported on 12/26/2024) 6 tablet 0    HYDROcodone-acetaminophen (NORCO) 7.5-325 mg per tablet Take 1 tablet by mouth every 6 (six) hours as needed for pain....May cause drowsiness. (Patient not taking: Reported on 1/24/2025) 15 tablet 0     No facility-administered encounter medications on file as of 1/24/2025.       Assessment & Plan    As above                                          No orders of the defined types were placed in this encounter.

## 2025-03-31 ENCOUNTER — OFFICE VISIT (OUTPATIENT)
Dept: UROLOGY | Facility: CLINIC | Age: 60
End: 2025-03-31
Payer: COMMERCIAL

## 2025-03-31 VITALS
OXYGEN SATURATION: 97 % | WEIGHT: 210 LBS | HEIGHT: 73 IN | DIASTOLIC BLOOD PRESSURE: 84 MMHG | SYSTOLIC BLOOD PRESSURE: 140 MMHG | BODY MASS INDEX: 27.83 KG/M2 | HEART RATE: 98 BPM

## 2025-03-31 DIAGNOSIS — Z92.3 HISTORY OF RADIATION THERAPY: ICD-10-CM

## 2025-03-31 DIAGNOSIS — C61 PROSTATE CANCER: Primary | ICD-10-CM

## 2025-03-31 PROCEDURE — 3079F DIAST BP 80-89 MM HG: CPT | Mod: CPTII,,, | Performed by: UROLOGY

## 2025-03-31 PROCEDURE — 1159F MED LIST DOCD IN RCRD: CPT | Mod: CPTII,,, | Performed by: UROLOGY

## 2025-03-31 PROCEDURE — 99999 PR PBB SHADOW E&M-EST. PATIENT-LVL III: CPT | Mod: PBBFAC,,, | Performed by: UROLOGY

## 2025-03-31 PROCEDURE — 99214 OFFICE O/P EST MOD 30 MIN: CPT | Mod: S$PBB,,, | Performed by: UROLOGY

## 2025-03-31 PROCEDURE — 99213 OFFICE O/P EST LOW 20 MIN: CPT | Mod: PBBFAC | Performed by: UROLOGY

## 2025-03-31 PROCEDURE — 3008F BODY MASS INDEX DOCD: CPT | Mod: CPTII,,, | Performed by: UROLOGY

## 2025-03-31 PROCEDURE — 1160F RVW MEDS BY RX/DR IN RCRD: CPT | Mod: CPTII,,, | Performed by: UROLOGY

## 2025-03-31 PROCEDURE — 3077F SYST BP >= 140 MM HG: CPT | Mod: CPTII,,, | Performed by: UROLOGY

## 2025-03-31 NOTE — PROGRESS NOTES
Assessment:   1. Prostate cancer    2. History of radiation therapy         Plan:     The patient finished radiation in February and is having minimal lower urinary tract symptoms since he completed treatment.  Plan to check PSA and return in 3 months for repeat PSA to determine what his PSA and symptoms are doing after treatment.     Plan:  We discussed the possibility of restarting hormones but at this time I do not suspect he will need them.  PSA today.  Return in 3 months with PSA            Rohit Bales MD  Urology  03/31/2025          UROLOGY HISTORY AND PHYSICAL EXAM    Subjective:      Patient ID: Mik Lyles is a 60 y.o. male.    Chief Complaint::   Follow-up  Pertinent negatives include no abdominal pain or headaches.     The patient is a 60-year-old male that underwent radiation therapy for prostate cancer.  He completed his treatment in February and presents today for follow-up.  He reports that he is feeling optimistic and doing well and has minimal lower urinary tract symptoms.     IPSS Questionnaire (AUA-7):  Over the past month    1)  How often have you had a sensation of not emptying your bladder completely after you finish urinating?  0 - Not at all   2)  How often have you had to urinate again less than two hours after you finished urinating? 2 - Less than half the time   3)  How often have you found you stopped and started again several times when you urinated?  1 - Less than 1 time in 5   4) How difficult have you found it to postpone urination?  1 - Less than 1 time in 5   5) How often have you had a weak urinary stream?  1 - Less than 1 time in 5   6) How often have you had to push or strain to begin urination?  0 - Not at all   7) How many times did you most typically get up to urinate from the time you went to bed until the time you got up in the morning?  2 - 2 times   Total score:  0-7 mildly symptomatic    8-19 moderately symptomatic    20-35 severely symptomatic           Past  Medical History:   Diagnosis Date    COPD (chronic obstructive pulmonary disease)      Past Surgical History:   Procedure Laterality Date    BIOPSY, PROSTATE, USING PROSTATE MAPPING Bilateral 12/6/2024    Procedure: BIOPSY PROSTATE USING PROSTATE MAPPING;  Surgeon: Rohit Bales MD;  Location: [facility] OR;  Service: Urology;  Laterality: Bilateral;    KNEE SURGERY Left     ROBOT-ASSISTED LAPAROSCOPIC REPAIR OF INGUINAL HERNIA USING DA ANDRE XI Left 12/13/2024    Procedure: XI ROBOTIC REPAIR, HERNIA, INGUINAL;  Surgeon: Rene Espinoza MD;  Location: [facility] OR;  Service: General;  Laterality: Left;        Medications Ordered Prior to Encounter[1]     Review of patient's allergies indicates:  No Known Allergies  Vitals:    03/31/25 0758   BP: (!) 140/84   Pulse: 98          Review of Systems   Constitutional:  Negative for activity change.   Respiratory:  Negative for shortness of breath.    Cardiovascular:  Negative for leg swelling.   Gastrointestinal:  Negative for abdominal pain.   Genitourinary:  Negative for difficulty urinating, dysuria, frequency and hematuria.   Skin:  Negative for wound.   Neurological:  Negative for headaches.   Psychiatric/Behavioral:  Negative for sleep disturbance.       Objective:     Physical Exam  Vitals and nursing note reviewed.   Constitutional:       Appearance: Normal appearance. He is normal weight.   HENT:      Head: Normocephalic and atraumatic.   Eyes:      General: No scleral icterus.     Conjunctiva/sclera: Conjunctivae normal.   Cardiovascular:      Rate and Rhythm: Normal rate.   Pulmonary:      Effort: Pulmonary effort is normal. No respiratory distress.      Breath sounds: No wheezing.   Abdominal:      General: There is no distension.   Musculoskeletal:         General: No deformity.   Skin:     General: Skin is warm and dry.      Findings: No rash.   Neurological:      General: No focal deficit present.      Mental Status: He is alert.   Psychiatric:          Mood and Affect: Mood normal.         Behavior: Behavior normal.         Thought Content: Thought content normal.         Judgment: Judgment normal.        Lab Results   Component Value Date    PSA 14.200 (H) 11/11/2024        CMP  Sodium   Date Value Ref Range Status   12/02/2024 134 (L) 136 - 145 mmol/L Final     Potassium   Date Value Ref Range Status   12/02/2024 4.1 3.5 - 5.1 mmol/L Final     Chloride   Date Value Ref Range Status   12/02/2024 104 98 - 107 mmol/L Final     CO2   Date Value Ref Range Status   12/02/2024 26 22 - 29 mmol/L Final     Glucose   Date Value Ref Range Status   12/02/2024 92 74 - 100 mg/dL Final     BUN   Date Value Ref Range Status   12/02/2024 17 8 - 26 mg/dL Final     Creatinine   Date Value Ref Range Status   12/02/2024 1.04 0.72 - 1.25 mg/dL Final     Calcium   Date Value Ref Range Status   12/02/2024 9.2 8.4 - 10.2 mg/dL Final     Anion Gap   Date Value Ref Range Status   12/02/2024 8 7 - 16 mmol/L Final     eGFR   Date Value Ref Range Status   12/02/2024 84 >=60 mL/min/1.73m2 Final      BMP  Lab Results   Component Value Date     (L) 12/02/2024    K 4.1 12/02/2024     12/02/2024    CO2 26 12/02/2024    BUN 17 12/02/2024    CREATININE 1.04 12/02/2024    CALCIUM 9.2 12/02/2024    ANIONGAP 8 12/02/2024    EGFRNORACEVR 84 12/02/2024                [1]  Current Outpatient Medications on File Prior to Visit   Medication Sig Dispense Refill    albuterol sulfate 90 mcg/actuation aebs Inhale 180 mcg into the lungs every 4 (four) hours as needed. prn      budesonide-glycopyr-formoterol (BREZTRI AEROSPHERE) 160-9-4.8 mcg/actuation HFAA Inhale 2 puffs into the lungs 2 (two) times daily.      montelukast (SINGULAIR) 10 mg tablet Take 10 mg by mouth every evening.      albuterol-ipratropium (DUO-NEB) 2.5 mg-0.5 mg/3 mL nebulizer solution Take 3 mLs by nebulization every 6 (six) hours as needed for Wheezing. Rescue (Patient not taking: Reported on 1/24/2025) 75 mL 0     ciprofloxacin HCl (CIPRO) 500 MG tablet Start taking this antibiotic the day prior to your biopsy.  Then take the morning of your biopsy with a small sip of water.  Then continue taking this antibiotic until it is completed. (Patient not taking: Reported on 12/26/2024) 6 tablet 0    HYDROcodone-acetaminophen (NORCO) 7.5-325 mg per tablet Take 1 tablet by mouth every 6 (six) hours as needed for pain....May cause drowsiness. (Patient not taking: Reported on 1/24/2025) 15 tablet 0     No current facility-administered medications on file prior to visit.

## 2025-04-24 ENCOUNTER — OFFICE VISIT (OUTPATIENT)
Dept: PULMONOLOGY | Facility: CLINIC | Age: 60
End: 2025-04-24
Payer: COMMERCIAL

## 2025-04-24 VITALS
WEIGHT: 209.44 LBS | DIASTOLIC BLOOD PRESSURE: 92 MMHG | HEIGHT: 73 IN | RESPIRATION RATE: 16 BRPM | BODY MASS INDEX: 27.76 KG/M2 | OXYGEN SATURATION: 97 % | SYSTOLIC BLOOD PRESSURE: 120 MMHG | HEART RATE: 97 BPM

## 2025-04-24 DIAGNOSIS — F17.211 CIGARETTE NICOTINE DEPENDENCE IN REMISSION: Primary | ICD-10-CM

## 2025-04-24 DIAGNOSIS — R06.00 DYSPNEA, UNSPECIFIED TYPE: ICD-10-CM

## 2025-04-24 DIAGNOSIS — R91.1 SOLITARY PULMONARY NODULE: ICD-10-CM

## 2025-04-24 DIAGNOSIS — J44.89 ASTHMA-COPD OVERLAP SYNDROME: ICD-10-CM

## 2025-04-24 DIAGNOSIS — J44.9 CHRONIC OBSTRUCTIVE PULMONARY DISEASE, UNSPECIFIED COPD TYPE: ICD-10-CM

## 2025-04-24 DIAGNOSIS — R06.2 WHEEZING: ICD-10-CM

## 2025-04-24 PROCEDURE — 3080F DIAST BP >= 90 MM HG: CPT | Mod: CPTII,,, | Performed by: STUDENT IN AN ORGANIZED HEALTH CARE EDUCATION/TRAINING PROGRAM

## 2025-04-24 PROCEDURE — 99999 PR PBB SHADOW E&M-EST. PATIENT-LVL IV: CPT | Mod: PBBFAC,,, | Performed by: STUDENT IN AN ORGANIZED HEALTH CARE EDUCATION/TRAINING PROGRAM

## 2025-04-24 PROCEDURE — 99214 OFFICE O/P EST MOD 30 MIN: CPT | Mod: PBBFAC | Performed by: STUDENT IN AN ORGANIZED HEALTH CARE EDUCATION/TRAINING PROGRAM

## 2025-04-24 PROCEDURE — 3074F SYST BP LT 130 MM HG: CPT | Mod: CPTII,,, | Performed by: STUDENT IN AN ORGANIZED HEALTH CARE EDUCATION/TRAINING PROGRAM

## 2025-04-24 PROCEDURE — 1159F MED LIST DOCD IN RCRD: CPT | Mod: CPTII,,, | Performed by: STUDENT IN AN ORGANIZED HEALTH CARE EDUCATION/TRAINING PROGRAM

## 2025-04-24 PROCEDURE — 3008F BODY MASS INDEX DOCD: CPT | Mod: CPTII,,, | Performed by: STUDENT IN AN ORGANIZED HEALTH CARE EDUCATION/TRAINING PROGRAM

## 2025-04-24 PROCEDURE — 99214 OFFICE O/P EST MOD 30 MIN: CPT | Mod: S$PBB,,, | Performed by: STUDENT IN AN ORGANIZED HEALTH CARE EDUCATION/TRAINING PROGRAM

## 2025-04-24 NOTE — PROGRESS NOTES
"Patient Name: Mik Lyles   Primary Care Provider: Damaris Salazar FNP-C   Date of Service:  11/11/24   Reason for Referral:  Pulmonary nodule    Chief Complaint: Follow-up (3mo follow up. Having "flare ups" of his COPD at least weekly. He is wanting to know if anything specifically triggers this. )      Subjective:      Mik Lyles is 60 y.o. male with past medical history significant for smoking who now presents in the setting of shortness of breath and pulmonary nodule    Follow up clinic visit 4/24/25  Patient presents for a follow-up visit to discuss his ongoing management of asthma-COPD overlap syndrome and recent radiation treatment for prostate cancer. Patient has a history of asthma-COPD overlap syndrome and recently completed radiation therapy for prostate cancer. He reports occasional flare-ups of his respiratory condition, characterized by chest tightness and difficulty breathing. These episodes occur unpredictably, with no clear triggers identified. He notes that sometimes his regular medication is less effective during these exacerbations. He typically uses a nebulizer treatment during his last work break, but the effectiveness varies. Some nights he does not need the treatment, while other nights he feels he needs additional treatment. He has been trying to identify potential triggers but has not found any consistent patterns. He denies identifying any consistent triggers for his respiratory exacerbations. Patient is on Breztri, a triple inhale therapy containing steroid, long-acting albuterol, and a third medication, taking 2 pumps twice daily for asthma COPD overlap syndrome. He also uses Albuterol, Budesonide, and Ipratropium via nebulizer as needed for breathing difficulties. Patient has successfully quit smoking         Follow up clinic visit 1/24/25   I reviewed his PET-CT scan myself which showed no evidence of FDG activity in the right middle lobe lung nodule.  In the interim, he has " now quit smoking after our efforts previously.  Continues on occasional DuoNebs and albuterol and daily breztri.      Follow up clinic visit 11/11/24   Asthma/COPD overlap syndrome on recent pulmonary function testing.  I reviewed the patient's CT chest with stable lung nodule present in the right middle lobe.  Experiencing exacerbation with increased expiratory wheezing, and increased exacerbation with ambulation of the patient's work (works at a [employer]).  Has been using more nebulized albuterol.      Initial clinic visit 9/9/24  5.8% risk   COPD, started nebulized medication with albuterol and inhaled corticosteroids, does not remember all medication .  Significant improvement  Low risk .  I personally reviewed the patient's CT chest and noted the lung nodule in the right middle lobe, and I agree that this may be an intrapulmonary lymph node.    Follow up November 11   Willingness to quit, 3-6 cigars per day  Dyspneic with exertion but able to walk up multiple flights of stairs daily   No wheezing on exam      Assessment and Plan      Solitary pulmonary nodule, stable   Asthma/COPD overlap syndrome  Dyspnea with exertion      Assessment:  Evidence of asthma/COPD overlap syndrome on pulmonary function testing, does not require oxygen with 6 minute walk test.  Exacerbation resolved now, markedly improved, since he quit smoking wheezing has also significantly reduced.  Also reviewed patient's pulmonary nodule which is stable at this time with no FDG activity.  Stable respiratory symptoms, prescribed him albuterol inhaler today.    Plan  Repeat CT low-dose in 1 year's time in January of 2026  Continue with triple inhaled therapy  Continue to use albuterol/nebulizer for rescue   Counseled to call the clinic or go to the emergency department/call 911 in the event of worsening symptoms or any other red flag signs/symptoms as explained to the patient in detail    Nicotine dependence, cigarettes , now in  remission  Tobacco cessation counseling      Assessment:  Patient has now successfully quit after intervention last time.  Patient congratulated and counseled on remaining abstinent from nicotine.    Plan  We will continue to monitor progress on next visit     Lung cancer screening      Assessment:  The patient meets criteria for lung cancer screening (has a greater than 20 pack-year history of smoking, is 59  years old and has not quit more than 15 years ago).  We had a discussion regarding what lung cancer screening entails and after shared decision-making, the patient agreed to low-dose CT scan.    Plan  Low-dose CT scan for lung cancer screening in January 2026      This note was generated with the assistance of ambient listening technology. Verbal consent was obtained by the patient and accompanying visitor(s) for the recording of patient appointment to facilitate this note. I attest to having reviewed and edited the generated note for accuracy, though some syntax or spelling errors may persist. Please contact the author of this note for any clarification.    Red Stuart MD  Interventional Pulmonary and Critical Care  [facility]      Problem List Items Addressed This Visit    None            Past Medical History:   Diagnosis Date    COPD (chronic obstructive pulmonary disease)       Past Surgical History:   Procedure Laterality Date    BIOPSY, PROSTATE, USING PROSTATE MAPPING Bilateral 12/6/2024    Procedure: BIOPSY PROSTATE USING PROSTATE MAPPING;  Surgeon: Rohit Bales MD;  Location: [facility] OR;  Service: Urology;  Laterality: Bilateral;    KNEE SURGERY Left     ROBOT-ASSISTED LAPAROSCOPIC REPAIR OF INGUINAL HERNIA USING DA ANDRE XI Left 12/13/2024    Procedure: XI ROBOTIC REPAIR, HERNIA, INGUINAL;  Surgeon: Rene Espinoza MD;  Location: [facility] OR;  Service: General;  Laterality: Left;     Family History   Problem Relation Name Age of Onset    Hypertension Mother      Cancer Father  "      Review of patient's allergies indicates:  No Known Allergies   Social History     Tobacco Use    Smoking status: Former     Types: Cigars     Start date: 1/1/1985     Passive exposure: Never    Smokeless tobacco: Never    Tobacco comments:     Quit one week ago.    Substance Use Topics    Alcohol use: Yes    Drug use: Yes     Types: Marijuana      Review of Systems       Objective:      Physical Exam  Constitutional:       General: He is not in acute distress.     Appearance: Normal appearance. He is normal weight. He is not ill-appearing or diaphoretic.   HENT:      Head: Normocephalic and atraumatic.      Nose: No congestion or rhinorrhea.      Mouth/Throat:      Mouth: Mucous membranes are moist.   Cardiovascular:      Rate and Rhythm: Normal rate and regular rhythm.      Pulses: Normal pulses.      Heart sounds: Normal heart sounds.   Pulmonary:      Effort: Pulmonary effort is normal. No respiratory distress.      Breath sounds: No stridor. No wheezing, rhonchi or rales.   Chest:      Chest wall: No tenderness.   Abdominal:      General: Abdomen is flat.   Musculoskeletal:      Cervical back: Normal range of motion. No rigidity.      Right lower leg: No edema.      Left lower leg: No edema.   Skin:     General: Skin is warm.      Findings: No erythema.   Neurological:      General: No focal deficit present.      Mental Status: He is alert and oriented to person, place, and time. Mental status is at baseline.   Psychiatric:         Mood and Affect: Mood normal.         Behavior: Behavior normal.         Thought Content: Thought content normal.                4/24/2025    10:43 AM 3/31/2025     7:58 AM 1/24/2025    10:19 AM 12/31/2024    12:31 PM 12/26/2024     9:46 AM 12/13/2024    12:00 PM 12/13/2024    11:45 AM   Pulmonary Function Tests   SpO2 97 % 97 % 96 %   95 % 94 %   Height 6' 1" (1.854 m) 6' 1" (1.854 m) 6' 1" (1.854 m) 6' 1" (1.854 m) 6' 1" (1.854 m)     Weight 95 kg (209 lb 7 oz) 95.3 kg (210 " lb) 96 kg (211 lb 10.3 oz) 96.6 kg (213 lb) 95.3 kg (210 lb)     BMI (Calculated) 27.6 27.7 27.9 28.1 27.7           Outpatient Encounter Medications as of 4/24/2025   Medication Sig Dispense Refill    budesonide-glycopyr-formoterol (BREZTRI AEROSPHERE) 160-9-4.8 mcg/actuation HFAA Inhale 2 puffs into the lungs 2 (two) times daily.      montelukast (SINGULAIR) 10 mg tablet Take 10 mg by mouth every evening.      [DISCONTINUED] albuterol sulfate 90 mcg/actuation aebs Inhale 180 mcg into the lungs every 4 (four) hours as needed. prn      albuterol sulfate 90 mcg/actuation aebs Inhale 180 mcg into the lungs every 4 (four) hours as needed. prn 1 each 11    albuterol-ipratropium (DUO-NEB) 2.5 mg-0.5 mg/3 mL nebulizer solution Take 3 mLs by nebulization every 6 (six) hours as needed for Wheezing. Rescue (Patient not taking: Reported on 12/31/2024) 75 mL 0    ciprofloxacin HCl (CIPRO) 500 MG tablet Start taking this antibiotic the day prior to your biopsy.  Then take the morning of your biopsy with a small sip of water.  Then continue taking this antibiotic until it is completed. (Patient not taking: Reported on 4/24/2025) 6 tablet 0    HYDROcodone-acetaminophen (NORCO) 7.5-325 mg per tablet Take 1 tablet by mouth every 6 (six) hours as needed for pain....May cause drowsiness. (Patient not taking: Reported on 12/31/2024) 15 tablet 0     No facility-administered encounter medications on file as of 4/24/2025.       Assessment & Plan    As above                                          No orders of the defined types were placed in this encounter.

## 2025-06-06 DIAGNOSIS — J44.9 CHRONIC OBSTRUCTIVE PULMONARY DISEASE, UNSPECIFIED COPD TYPE: ICD-10-CM

## 2025-06-06 NOTE — TELEPHONE ENCOUNTER
Copied from CRM #[id number]. Topic: Medications - Medication Refill  >> Jun 6, 2025 12:45 PM Tiffany wrote:  Who Called: Mik Lyles    Refill or New Rx:Refill  RX Name and Strength:BREZTRI  INHALER  How is the patient currently taking it? (ex. 1XDay):  Is this a 30 day or 90 day RX:  Local or Mail Order:LOCAL  List of preferred pharmacies on file (remove unneeded): [unfilled]  If different Pharmacy is requested, enter Pharmacy information here including location and phone number: [facility] [place]   Ordering Provider:LEANN STEWART      Preferred Method of Contact: Phone Call  Patient's Preferred Phone Number on File: [phone]   Best Call Back Number, if different:  Additional Information: PATIENT IS REQUESTING A REFILL BUT ALSO ASKS IF HE NEEDS TO STAY ON THIS INHALER

## 2025-06-09 ENCOUNTER — TELEPHONE (OUTPATIENT)
Dept: UROLOGY | Facility: CLINIC | Age: 60
End: 2025-06-09
Payer: COMMERCIAL

## 2025-06-09 ENCOUNTER — TELEPHONE (OUTPATIENT)
Dept: PULMONOLOGY | Facility: CLINIC | Age: 60
End: 2025-06-09
Payer: COMMERCIAL

## 2025-06-09 RX ORDER — BUDESONIDE, GLYCOPYRROLATE, AND FORMOTEROL FUMARATE 160; 9; 4.8 UG/1; UG/1; UG/1
2 AEROSOL, METERED RESPIRATORY (INHALATION) 2 TIMES DAILY
Qty: 10.7 G | Refills: 11 | Status: SHIPPED | OUTPATIENT
Start: 2025-06-09

## 2025-06-09 NOTE — TELEPHONE ENCOUNTER
Copied from CRM #[id number]. Topic: Appointments - Appointment Confirmation  >> Jun 9, 2025 11:00 AM Charla wrote:  Who Called: Mik Lyles    Caller is requesting assistance/information from provider's office.    Patient is wanting to know if there is a sooner appt scheduled for 06/16 says he received a text message.       Preferred Method of Contact: Phone Call  Patient's Preferred Phone Number on File: [phone]   Best Call Back Number, if different:  Additional Information:

## 2025-06-09 NOTE — TELEPHONE ENCOUNTER
Copied from CRM #[id number]. Topic: Medications - Medication Refill  >> Jun 9, 2025  1:13 PM Nesha wrote:  Who Called: Mik Lyles    Refill or New Rx:Refill  RX Name and Strength: Disp Refills Start End   budesonide-glycopyr-formoterol (BREZTRI AEROSPHERE) 160-9-4.8 mcg/actuation HFAA           How is the patient currently taking it? (ex. 1XDay):2XDay  Is this a 30 day or 90 day  Local or Mail Order:Local  List of preferred pharmacies on file (remove unneeded):[facility] [place] [address]  Phone: [phone] Fax: [phone]     Ordering Provider:Ria      Preferred Method of Contact: Phone Call  Patient's Preferred Phone Number on File: [phone]   Best Call Back Number, if different:  Additional Information:

## 2025-06-23 RX ORDER — BUDESONIDE 0.25 MG/2ML
0.25 INHALANT ORAL 2 TIMES DAILY PRN
Qty: 180 ML | Refills: 3 | Status: SHIPPED | OUTPATIENT
Start: 2025-06-23 | End: 2026-06-23

## 2025-06-23 NOTE — TELEPHONE ENCOUNTER
Pharmacy sent in fax requesting refills of medication. I did not see it in patients med list so I added it but please review. Thank you

## 2025-06-26 DIAGNOSIS — J44.9 CHRONIC OBSTRUCTIVE PULMONARY DISEASE, UNSPECIFIED COPD TYPE: Primary | ICD-10-CM

## 2025-06-26 RX ORDER — IPRATROPIUM BROMIDE AND ALBUTEROL SULFATE 2.5; .5 MG/3ML; MG/3ML
3 SOLUTION RESPIRATORY (INHALATION) EVERY 6 HOURS PRN
Qty: 75 ML | Refills: 0 | Status: CANCELLED | OUTPATIENT
Start: 2025-06-26 | End: 2026-06-26

## 2025-06-26 NOTE — TELEPHONE ENCOUNTER
Copied from CRM #[id number]. Topic: Medications - Medication Refill  >> Jun 26, 2025  2:04 PM Viviana wrote:  Who Called: Mik Lyles    Refill or New Rx:Refill  RX Name and Strength:albuterol-ipratropium (DUO-NEB) 2.5 mg-0.5 mg/3 mL nebulizer solution  How is the patient currently taking it? (ex. 1XDay):  Is this a 30 day or 90 day RX:75ml  Local or Mail Order:local  List of preferred pharmacies on file (remove unneeded): [unfilled]  If different Pharmacy is requested, enter Pharmacy information here including location and phone number:    [facility] / ?????.?? [place] [address]  Phone: [phone] Fax: [phone]  Hours: Not open 24 hours          Preferred Method of Contact: Phone Call  Patient's Preferred Phone Number on File: [phone]

## 2025-06-30 NOTE — TELEPHONE ENCOUNTER
Source   Mik Lyles (Patient)    Subject   Mik Lyles (Patient)    Topic   Medications - Medication Status Check        Summary   Medication Status Check   Communication   Who Called: Mik Lyles            Caller is requesting assistance/information from provider's office.                  Preferred Method of Contact: Phone Call      Patient's Preferred Phone Number on File: [phone]      Best Call Back Number, if different:      Additional Information: Patient called back regarding a prescription refill of albuterol-ipratropium (DUO-NEB) 2.5 mg-0.5 mg/3 mL nebulizer solution to [facility] - [place], [place] - [address].

## 2025-07-01 NOTE — TELEPHONE ENCOUNTER
Spoke with patient, wanted update on his medication refill for his duo-nebs, did voice I will send Dr. Stuart a reminder message about getting that refilled. Pt aware and understood at this time

## 2025-07-02 RX ORDER — IPRATROPIUM BROMIDE AND ALBUTEROL SULFATE 2.5; .5 MG/3ML; MG/3ML
3 SOLUTION RESPIRATORY (INHALATION) EVERY 6 HOURS PRN
Qty: 75 ML | Refills: 3 | Status: SHIPPED | OUTPATIENT
Start: 2025-07-02 | End: 2026-07-02

## 2025-07-07 ENCOUNTER — OFFICE VISIT (OUTPATIENT)
Dept: UROLOGY | Facility: CLINIC | Age: 60
End: 2025-07-07
Payer: COMMERCIAL

## 2025-07-07 VITALS
HEART RATE: 85 BPM | DIASTOLIC BLOOD PRESSURE: 81 MMHG | HEIGHT: 72 IN | BODY MASS INDEX: 28.31 KG/M2 | TEMPERATURE: 99 F | WEIGHT: 209 LBS | SYSTOLIC BLOOD PRESSURE: 127 MMHG | OXYGEN SATURATION: 99 %

## 2025-07-07 DIAGNOSIS — C61 PROSTATE CANCER: Primary | ICD-10-CM

## 2025-07-07 DIAGNOSIS — Z92.3 HISTORY OF RADIATION THERAPY: ICD-10-CM

## 2025-07-07 PROCEDURE — 99999 PR PBB SHADOW E&M-EST. PATIENT-LVL IV: CPT | Mod: PBBFAC,,, | Performed by: UROLOGY

## 2025-07-07 PROCEDURE — 99213 OFFICE O/P EST LOW 20 MIN: CPT | Mod: S$PBB,,, | Performed by: UROLOGY

## 2025-07-07 PROCEDURE — 99214 OFFICE O/P EST MOD 30 MIN: CPT | Mod: PBBFAC | Performed by: UROLOGY

## 2025-07-07 PROCEDURE — 1160F RVW MEDS BY RX/DR IN RCRD: CPT | Mod: CPTII,,, | Performed by: UROLOGY

## 2025-07-07 PROCEDURE — 3079F DIAST BP 80-89 MM HG: CPT | Mod: CPTII,,, | Performed by: UROLOGY

## 2025-07-07 PROCEDURE — 3008F BODY MASS INDEX DOCD: CPT | Mod: CPTII,,, | Performed by: UROLOGY

## 2025-07-07 PROCEDURE — 3074F SYST BP LT 130 MM HG: CPT | Mod: CPTII,,, | Performed by: UROLOGY

## 2025-07-07 PROCEDURE — 1159F MED LIST DOCD IN RCRD: CPT | Mod: CPTII,,, | Performed by: UROLOGY

## 2025-07-07 NOTE — PROGRESS NOTES
Assessment:   1. Prostate cancer  -     PSA, Total (Diagnostic); Future; Expected date: 10/07/2025  -     Testosterone; Future; Expected date: 10/07/2025    2. History of radiation therapy  -     PSA, Total (Diagnostic); Future; Expected date: 10/07/2025  -     Testosterone; Future; Expected date: 10/07/2025         Plan:     Today's PSA is 0.18 and he is doing well after radiation treatment.  We discussed continuing androgen deprivation therapy and he would like to postpone at this time.     Plan:  Recheck PSA and testosterone in 3 months.  Plan to obtain these labs prior to his visit so that they are available on our day of consultation.           Rohit Bales MD  Urology  07/07/2025          UROLOGY HISTORY AND PHYSICAL EXAM    Subjective:      Patient ID: Mik Lyles is a 60 y.o. male.    Chief Complaint::   Follow-up  Pertinent negatives include no abdominal pain.     The patient is a 60-year-old male with a history of prostate cancer that presents today for follow-up after completing radiation.  He reports he is not having any lower urinary tract symptoms reports no hesitancy urgency or sensation of incomplete bladder emptying.  His PSA is 0.18    Past Medical History:   Diagnosis Date    COPD (chronic obstructive pulmonary disease)      Past Surgical History:   Procedure Laterality Date    BIOPSY, PROSTATE, USING PROSTATE MAPPING Bilateral 12/6/2024    Procedure: BIOPSY PROSTATE USING PROSTATE MAPPING;  Surgeon: Rohit Bales MD;  Location: [facility] OR;  Service: Urology;  Laterality: Bilateral;    KNEE SURGERY Left     ROBOT-ASSISTED LAPAROSCOPIC REPAIR OF INGUINAL HERNIA USING DA ANDRE XI Left 12/13/2024    Procedure: XI ROBOTIC REPAIR, HERNIA, INGUINAL;  Surgeon: Rene Espinoza MD;  Location: [facility] OR;  Service: General;  Laterality: Left;        Medications Ordered Prior to Encounter[1]     Review of patient's allergies indicates:  No Known Allergies  Vitals:    07/07/25 0741   BP:  127/81   Pulse: 85   Temp: 98.6 °F (37 °C)          Review of Systems   Constitutional:  Negative for activity change.   Respiratory:  Negative for chest tightness and shortness of breath.    Cardiovascular:  Negative for leg swelling.   Gastrointestinal:  Negative for abdominal pain.   Genitourinary:  Negative for difficulty urinating.   Musculoskeletal:  Negative for back pain.      Objective:     Physical Exam  Vitals and nursing note reviewed.   Constitutional:       Appearance: Normal appearance. He is normal weight.   HENT:      Head: Normocephalic and atraumatic.   Eyes:      General: No scleral icterus.     Conjunctiva/sclera: Conjunctivae normal.   Cardiovascular:      Rate and Rhythm: Normal rate.   Pulmonary:      Effort: No respiratory distress.      Breath sounds: No wheezing.   Abdominal:      General: There is no distension.      Palpations: Abdomen is soft. There is no mass.      Tenderness: There is no abdominal tenderness.   Musculoskeletal:         General: No deformity.   Skin:     General: Skin is warm and dry.      Findings: No rash.   Neurological:      General: No focal deficit present.      Mental Status: He is alert.   Psychiatric:         Mood and Affect: Mood normal.         Behavior: Behavior normal.         Thought Content: Thought content normal.         Judgment: Judgment normal.        Lab Results   Component Value Date    PSA 0.187 07/07/2025    PSA 1.015 03/31/2025    PSA 14.200 (H) 11/11/2024        CMP  Sodium   Date Value Ref Range Status   12/02/2024 134 (L) 136 - 145 mmol/L Final     Potassium   Date Value Ref Range Status   12/02/2024 4.1 3.5 - 5.1 mmol/L Final     Chloride   Date Value Ref Range Status   12/02/2024 104 98 - 107 mmol/L Final     CO2   Date Value Ref Range Status   12/02/2024 26 22 - 29 mmol/L Final     Glucose   Date Value Ref Range Status   12/02/2024 92 74 - 100 mg/dL Final     BUN   Date Value Ref Range Status   12/02/2024 17 8 - 26 mg/dL Final      Creatinine   Date Value Ref Range Status   12/02/2024 1.04 0.72 - 1.25 mg/dL Final     Calcium   Date Value Ref Range Status   12/02/2024 9.2 8.4 - 10.2 mg/dL Final     Anion Gap   Date Value Ref Range Status   12/02/2024 8 7 - 16 mmol/L Final     eGFR   Date Value Ref Range Status   12/02/2024 84 >=60 mL/min/1.73m2 Final      BMP  Lab Results   Component Value Date     (L) 12/02/2024    K 4.1 12/02/2024     12/02/2024    CO2 26 12/02/2024    BUN 17 12/02/2024    CREATININE 1.04 12/02/2024    CALCIUM 9.2 12/02/2024    ANIONGAP 8 12/02/2024    EGFRNORACEVR 84 12/02/2024                [1]  Current Outpatient Medications on File Prior to Visit   Medication Sig Dispense Refill    albuterol sulfate 90 mcg/actuation aebs Inhale 180 mcg into the lungs every 4 (four) hours as needed. prn 1 each 11    albuterol-ipratropium (DUO-NEB) 2.5 mg-0.5 mg/3 mL nebulizer solution Take 3 mLs by nebulization every 6 (six) hours as needed for Wheezing. Rescue 75 mL 3    budesonide (PULMICORT) 0.25 mg/2 mL nebulizer solution Take 2 mLs (0.25 mg total) by nebulization 2 (two) times daily as needed (Wheezing). Controller 180 mL 3    budesonide-glycopyr-formoterol (BREZTRI AEROSPHERE) 160-9-4.8 mcg/actuation HFAA Inhale 2 puffs into the lungs 2 (two) times daily. Inhale 2 puffs into the lungs 2 (two) times daily. 10.7 g 11    montelukast (SINGULAIR) 10 mg tablet Take 10 mg by mouth every evening.      ciprofloxacin HCl (CIPRO) 500 MG tablet Start taking this antibiotic the day prior to your biopsy.  Then take the morning of your biopsy with a small sip of water.  Then continue taking this antibiotic until it is completed. (Patient not taking: Reported on 12/26/2024) 6 tablet 0    HYDROcodone-acetaminophen (NORCO) 7.5-325 mg per tablet Take 1 tablet by mouth every 6 (six) hours as needed for pain....May cause drowsiness. (Patient not taking: Reported on 7/7/2025) 15 tablet 0     No current facility-administered  medications on file prior to visit.

## 2025-08-11 DIAGNOSIS — J44.9 CHRONIC OBSTRUCTIVE PULMONARY DISEASE, UNSPECIFIED COPD TYPE: Primary | ICD-10-CM

## 2025-08-11 RX ORDER — IPRATROPIUM BROMIDE AND ALBUTEROL SULFATE 2.5; .5 MG/3ML; MG/3ML
3 SOLUTION RESPIRATORY (INHALATION) EVERY 6 HOURS PRN
Qty: 75 ML | Refills: 3 | Status: SHIPPED | OUTPATIENT
Start: 2025-08-11 | End: 2026-08-11

## (undated) DEVICE — GLOVE SENSICARE PI SURG 6.5

## (undated) DEVICE — SYR 10CC LUER LOCK

## (undated) DEVICE — GLOVE SENSICARE PI SURG 7

## (undated) DEVICE — WARMER BLUE HEAT SCOPE 3-12MM

## (undated) DEVICE — SET PNEUMOCLEAR HEAT HUM SE HF

## (undated) DEVICE — Device

## (undated) DEVICE — SUT MONOCYRL 4-0 PS2 UND

## (undated) DEVICE — DRIVER NEEDLE SUTURECUT MEGA

## (undated) DEVICE — SCISSOR HOT SHEARS XI

## (undated) DEVICE — GLOVE SENSICARE PI GRN 6

## (undated) DEVICE — PAD PINK TRENDELENBURG POS XL

## (undated) DEVICE — DRAPE THREE-QTR REINF 53X77IN

## (undated) DEVICE — COVER TIP CURVED SCISSORS XI

## (undated) DEVICE — COVER PROXIMA MAYO STAND

## (undated) DEVICE — SEAL UNIVERSAL 5MM-8MM XI

## (undated) DEVICE — TRAY CATH 1-LYR URIMTR 16FR

## (undated) DEVICE — DRAPE ARM DAVINCI XI

## (undated) DEVICE — ADHESIVE MASTISOL VIAL 48/BX

## (undated) DEVICE — TROCAR ENDO Z THREAD KII 5X100

## (undated) DEVICE — STRIP MEDI WND CLSR 1/2X4IN

## (undated) DEVICE — PROGRASP DA VINCI

## (undated) DEVICE — OBTURATOR BLADELESS 8MM XI CLR

## (undated) DEVICE — SUT VLOC 180 3-0 GRN B-20

## (undated) DEVICE — GLOVE SENSICARE PI SURG 6

## (undated) DEVICE — NEEDLE GUIDE ENDOCAVITY

## (undated) DEVICE — SOL NACL IRR 1000ML BTL

## (undated) DEVICE — NDL MAXCORE BIOPSY 18G 25CM

## (undated) DEVICE — SEAL CANN UNIVERSAL 5-12MM